# Patient Record
Sex: MALE | Race: BLACK OR AFRICAN AMERICAN | Employment: UNEMPLOYED | URBAN - NONMETROPOLITAN AREA
[De-identification: names, ages, dates, MRNs, and addresses within clinical notes are randomized per-mention and may not be internally consistent; named-entity substitution may affect disease eponyms.]

---

## 2018-05-09 ENCOUNTER — HOSPITAL ENCOUNTER (EMERGENCY)
Age: 28
Discharge: HOME | End: 2018-05-09

## 2018-05-09 VITALS
SYSTOLIC BLOOD PRESSURE: 130 MMHG | WEIGHT: 180.5 LBS | OXYGEN SATURATION: 100 % | DIASTOLIC BLOOD PRESSURE: 80 MMHG | TEMPERATURE: 99.2 F | RESPIRATION RATE: 18 BRPM | HEIGHT: 69 IN | HEART RATE: 88 BPM | BODY MASS INDEX: 26.73 KG/M2

## 2018-05-09 DIAGNOSIS — R07.81 RIB PAIN ON RIGHT SIDE: Primary | ICD-10-CM

## 2018-05-09 PROCEDURE — 96372 THER/PROPH/DIAG INJ SC/IM: CPT

## 2018-05-09 PROCEDURE — 6360000002 HC RX W HCPCS: Performed by: NURSE PRACTITIONER

## 2018-05-09 PROCEDURE — 99212 OFFICE O/P EST SF 10 MIN: CPT

## 2018-05-09 PROCEDURE — 99213 OFFICE O/P EST LOW 20 MIN: CPT | Performed by: NURSE PRACTITIONER

## 2018-05-09 RX ORDER — KETOROLAC TROMETHAMINE 30 MG/ML
30 INJECTION, SOLUTION INTRAMUSCULAR; INTRAVENOUS ONCE
Status: COMPLETED | OUTPATIENT
Start: 2018-05-09 | End: 2018-05-09

## 2018-05-09 RX ORDER — CYCLOBENZAPRINE HCL 10 MG
10 TABLET ORAL 3 TIMES DAILY PRN
Qty: 15 TABLET | Refills: 0 | Status: SHIPPED | OUTPATIENT
Start: 2018-05-09 | End: 2021-02-04

## 2018-05-09 RX ORDER — NAPROXEN 500 MG/1
500 TABLET ORAL 2 TIMES DAILY PRN
Qty: 60 TABLET | Refills: 0 | Status: SHIPPED | OUTPATIENT
Start: 2018-05-09 | End: 2021-02-04

## 2018-05-09 RX ADMIN — KETOROLAC TROMETHAMINE 30 MG: 30 INJECTION, SOLUTION INTRAMUSCULAR at 09:37

## 2018-05-09 ASSESSMENT — ENCOUNTER SYMPTOMS
VOMITING: 0
CHEST TIGHTNESS: 0
COLOR CHANGE: 0
NAUSEA: 0
SHORTNESS OF BREATH: 0
DIARRHEA: 0
ABDOMINAL PAIN: 0

## 2018-05-09 ASSESSMENT — PAIN SCALES - GENERAL
PAINLEVEL_OUTOF10: 10
PAINLEVEL_OUTOF10: 10

## 2018-05-09 ASSESSMENT — PAIN DESCRIPTION - DESCRIPTORS: DESCRIPTORS: ACHING

## 2018-05-09 ASSESSMENT — PAIN DESCRIPTION - ORIENTATION: ORIENTATION: RIGHT

## 2018-05-09 ASSESSMENT — PAIN DESCRIPTION - LOCATION: LOCATION: RIB CAGE

## 2021-02-04 ENCOUNTER — HOSPITAL ENCOUNTER (EMERGENCY)
Age: 31
Discharge: HOME | End: 2021-02-04

## 2021-02-04 VITALS
SYSTOLIC BLOOD PRESSURE: 155 MMHG | DIASTOLIC BLOOD PRESSURE: 86 MMHG | HEIGHT: 68 IN | RESPIRATION RATE: 16 BRPM | BODY MASS INDEX: 28.49 KG/M2 | HEART RATE: 104 BPM | TEMPERATURE: 97 F | OXYGEN SATURATION: 97 % | WEIGHT: 188 LBS

## 2021-02-04 DIAGNOSIS — R73.9 ACUTE HYPERGLYCEMIA: ICD-10-CM

## 2021-02-04 DIAGNOSIS — G57.90 NEURITIS OF FOOT, UNSPECIFIED LATERALITY: Primary | ICD-10-CM

## 2021-02-04 DIAGNOSIS — Z71.3 NUTRITIONAL COUNSELING: ICD-10-CM

## 2021-02-04 LAB — GLUCOSE BLD-MCNC: 132 MG/DL (ref 70–108)

## 2021-02-04 PROCEDURE — 99213 OFFICE O/P EST LOW 20 MIN: CPT

## 2021-02-04 PROCEDURE — 82948 REAGENT STRIP/BLOOD GLUCOSE: CPT

## 2021-02-04 RX ORDER — M-VIT,TX,IRON,MINS/CALC/FOLIC 27MG-0.4MG
1 TABLET ORAL DAILY
Refills: 0 | COMMUNITY
Start: 2021-02-04

## 2021-02-04 ASSESSMENT — ENCOUNTER SYMPTOMS
COUGH: 0
WHEEZING: 0
APNEA: 0
CHEST TIGHTNESS: 0
CHOKING: 0
BACK PAIN: 0
COLOR CHANGE: 0
STRIDOR: 0
SHORTNESS OF BREATH: 0

## 2021-02-04 ASSESSMENT — PAIN SCALES - GENERAL: PAINLEVEL_OUTOF10: 7

## 2021-02-04 NOTE — ED NOTES
To [facility] with complaints of james feet, last 3 toes on each foot are numb and tingling. States it started on Monday. Called into work Wed. States a little better today. Concerned bc diabetes and sickle cell runs in family. Also has a lump on right side abd for years.       Nelda Gibson RN  02/04/21 1012

## 2021-02-04 NOTE — ED NOTES
Fasting  accucheck  Completed  132 mg/dl CNP notified of result. No action taken . Last meal  5pm last night nothing to drink since except water.      Kemal Vides LPN  89/80/68 [id number]

## 2021-02-04 NOTE — ED PROVIDER NOTES
[place] [address]  Urgent Care Encounter      CHIEF COMPLAINT       Chief Complaint   Patient presents with    Numbness     james feet last 3 toes numb and tingling. Started Monday. Called into work Wed.  Tingling    Other     lump on right side for years       Nurses Notes reviewed and I agree except as noted in the HPI. HISTORY OFPRESENT ILLNESS   Sarwat Fan is a 26 y.o. The history is provided by the patient. No  was used. Foot Problem  Location:  Toe  Time since incident:  2 days  Injury: no    Toe location:  R third toe, R fourth toe, R little toe, L third toe, L fourth toe and L little toe  Pain details:     Quality:  Cramping, burning and tingling    Radiates to:  Does not radiate    Severity:  Moderate    Onset quality:  Gradual    Duration:  3 days    Timing:  Intermittent    Progression:  Worsening (overnight and early morning)  Chronicity:  New  Dislocation: no    Foreign body present:  No foreign bodies  Tetanus status:  Unknown  Prior injury to area:  No  Relieved by:  Nothing  Worsened by:  Extension, flexion and activity (early morning )  Ineffective treatments:  None tried  Associated symptoms: numbness and tingling    Associated symptoms: no back pain, no decreased ROM, no fatigue, no fever, no itching, no muscle weakness, no neck pain, no stiffness and no swelling    Risk factors: no concern for non-accidental trauma, no frequent fractures, no known bone disorder, no obesity and no recent illness        REVIEW OF SYSTEMS     Review of Systems   Constitutional: Negative for activity change, appetite change, chills, diaphoresis, fatigue, fever and unexpected weight change. Respiratory: Negative for apnea, cough, choking, chest tightness, shortness of breath, wheezing and stridor. Cardiovascular: Negative for chest pain, palpitations and leg swelling. Musculoskeletal: Positive for gait problem.  Negative for arthralgias, back pain, joint swelling, myalgias, neck pain, neck stiffness and stiffness. Early morning, first step out of bed. Also causes problems sleeping. Skin: Negative for color change, itching, pallor, rash and wound. Neurological: Positive for numbness. Negative for dizziness and light-headedness. Psychiatric/Behavioral: Positive for sleep disturbance. PAST MEDICAL HISTORY   History reviewed. No pertinent past medical history. SURGICAL HISTORY     Patient  has no past surgical history on file. CURRENT MEDICATIONS       Discharge Medication List as of 2/4/2021 10:32 AM          ALLERGIES     Patient is has No Known Allergies. FAMILY HISTORY     Patient's family history is not on file. SOCIAL HISTORY     Patient  reports that he has been smoking cigars and cigarettes. He has been smoking about 1.00 pack per day. He uses smokeless tobacco. He reports previous alcohol use. He reports that he does not use drugs. PHYSICAL EXAM     ED TRIAGE VITALS  BP: (!) 155/86, Temp: 97 °F (36.1 °C), Pulse: 104, Resp: 16, SpO2: 97 %  Physical Exam  Vitals signs and nursing note reviewed. Constitutional:       General: He is not in acute distress. Appearance: Normal appearance. He is normal weight. He is not ill-appearing, toxic-appearing or diaphoretic. HENT:      Head: Normocephalic and atraumatic. Right Ear: External ear normal.      Left Ear: External ear normal.   Eyes:      Extraocular Movements: Extraocular movements intact. Conjunctiva/sclera: Conjunctivae normal.   Neck:      Musculoskeletal: Normal range of motion. Cardiovascular:      Pulses: Normal pulses. Pulmonary:      Effort: Pulmonary effort is normal.   Musculoskeletal: Normal range of motion. General: No swelling, tenderness, deformity or signs of injury. Right ankle: Achilles tendon normal. Achilles tendon exhibits no pain and no defect.       Left ankle: Achilles tendon normal. Achilles tendon exhibits no pain and no defect. Right lower leg: He exhibits no tenderness, no bony tenderness, no swelling, no deformity and no laceration. No edema. Left lower leg: He exhibits no tenderness, no bony tenderness, no swelling, no deformity and no laceration. No edema. Right foot: Normal range of motion and normal capillary refill. No tenderness, bony tenderness, swelling, crepitus, deformity or laceration. Left foot: Normal range of motion and normal capillary refill. No tenderness, bony tenderness, swelling, crepitus, deformity or laceration. Skin:     General: Skin is warm. Coloration: Skin is not jaundiced or pale. Findings: No bruising, erythema, lesion or rash. Neurological:      General: No focal deficit present. Mental Status: He is alert and oriented to person, place, and time. Cranial Nerves: No cranial nerve deficit. Sensory: No sensory deficit. Motor: No weakness. Coordination: Coordination normal.      Gait: Gait normal.      Deep Tendon Reflexes: Reflexes normal.   Psychiatric:         Mood and Affect: Mood normal.         Behavior: Behavior normal.         Thought Content: Thought content normal.         Judgment: Judgment normal.         DIAGNOSTIC RESULTS   Labs:No results found for this visit on 02/04/21. IMAGING:  No orders to display     URGENT CARE COURSE:     Vitals:    02/04/21 1009   BP: (!) 155/86   Pulse: 104   Resp: 16   Temp: 97 °F (36.1 °C)   TempSrc: Temporal   SpO2: 97%   Weight: 188 lb (85.3 kg)   Height: 5' 8\" (1.727 m)       Medications - No data to display  PROCEDURES:  None  FINAL IMPRESSION      1. Neuritis of foot, unspecified laterality    2. Acute hyperglycemia    3. Nutritional counseling        DISPOSITION/PLAN     Patient notified of High Blood Glucose reading advised to monitor glucose preferably daily at the same time with the same equipment.    Reduce sodium intake daily, increase your activity such as walking, lose weight or monitor your weight, Stop tobacco use if applicable, reduce caffine consumption. Follow up with your PCP or make an appointment when you receive a call from the transition clinic,  the physician referral program to get established with a local provider for continued management of Type 1 DM. Go directly to the Emergency Department for any concerns.       PATIENT REFERRED TO:  [facility]  [id number]  Call today  keep scheduled appointemnt for tomorrow, 2/5/21,  at 9 am    DISCHARGE MEDICATIONS:  Discharge Medication List as of 2/4/2021 10:32 AM        Discharge Medication List as of 2/4/2021 10:32 AM          JEOVANY Santillan CNP, APRN - CNP  02/04/21 1039

## 2021-02-05 ENCOUNTER — OFFICE VISIT (OUTPATIENT)
Dept: FAMILY MEDICINE CLINIC | Age: 31
End: 2021-02-05

## 2021-02-05 VITALS
OXYGEN SATURATION: 96 % | WEIGHT: 170.8 LBS | HEART RATE: 96 BPM | DIASTOLIC BLOOD PRESSURE: 72 MMHG | RESPIRATION RATE: 16 BRPM | SYSTOLIC BLOOD PRESSURE: 114 MMHG | BODY MASS INDEX: 25.97 KG/M2 | TEMPERATURE: 97.2 F

## 2021-02-05 DIAGNOSIS — A59.9 TRICHIMONIASIS: ICD-10-CM

## 2021-02-05 DIAGNOSIS — F10.10 ALCOHOL ABUSE: ICD-10-CM

## 2021-02-05 DIAGNOSIS — R73.01 ELEVATED FASTING GLUCOSE: Primary | ICD-10-CM

## 2021-02-05 DIAGNOSIS — F10.930 ALCOHOL WITHDRAWAL SYNDROME WITHOUT COMPLICATION (HCC): ICD-10-CM

## 2021-02-05 LAB — HBA1C MFR BLD: 4.9 % (ref 4.3–5.7)

## 2021-02-05 PROCEDURE — 99203 OFFICE O/P NEW LOW 30 MIN: CPT | Performed by: STUDENT IN AN ORGANIZED HEALTH CARE EDUCATION/TRAINING PROGRAM

## 2021-02-05 PROCEDURE — 83036 HEMOGLOBIN GLYCOSYLATED A1C: CPT | Performed by: STUDENT IN AN ORGANIZED HEALTH CARE EDUCATION/TRAINING PROGRAM

## 2021-02-05 RX ORDER — METRONIDAZOLE 500 MG/1
500 TABLET ORAL 2 TIMES DAILY
Qty: 14 TABLET | Refills: 0 | Status: SHIPPED | OUTPATIENT
Start: 2021-02-05 | End: 2021-02-12

## 2021-02-05 SDOH — ECONOMIC STABILITY: TRANSPORTATION INSECURITY
IN THE PAST 12 MONTHS, HAS THE LACK OF TRANSPORTATION KEPT YOU FROM MEDICAL APPOINTMENTS OR FROM GETTING MEDICATIONS?: NO

## 2021-02-05 SDOH — ECONOMIC STABILITY: TRANSPORTATION INSECURITY
IN THE PAST 12 MONTHS, HAS LACK OF TRANSPORTATION KEPT YOU FROM MEETINGS, WORK, OR FROM GETTING THINGS NEEDED FOR DAILY LIVING?: NO

## 2021-02-05 SDOH — ECONOMIC STABILITY: FOOD INSECURITY: WITHIN THE PAST 12 MONTHS, THE FOOD YOU BOUGHT JUST DIDN'T LAST AND YOU DIDN'T HAVE MONEY TO GET MORE.: NEVER TRUE

## 2021-02-05 SDOH — ECONOMIC STABILITY: INCOME INSECURITY: HOW HARD IS IT FOR YOU TO PAY FOR THE VERY BASICS LIKE FOOD, HOUSING, MEDICAL CARE, AND HEATING?: SOMEWHAT HARD

## 2021-02-05 ASSESSMENT — PATIENT HEALTH QUESTIONNAIRE - PHQ9
SUM OF ALL RESPONSES TO PHQ9 QUESTIONS 1 & 2: 0
1. LITTLE INTEREST OR PLEASURE IN DOING THINGS: 0

## 2021-02-05 NOTE — PROGRESS NOTES
[id number] [place] [id number]  Dept: [phone]  Dept Fax: [phone]  Loc: Henri Metz is a 26 y.o. male who presents today for:  Chief Complaint   Patient presents with    ED Follow-up    Numbness     feet and lower leg x4 days    New Patient       Goals    None         HPI:     HPI  Makenna Ferrer is a 26 y.o. male who presents today to [facility] care. Patient has a PMHx of alcohol abuse. Complaining of bilateral feet and toe numbness and tingling that started around 4 days ago. Patient states he has not noticed any pain to his back or to his legs at this time. Patient admits that he stopped drinking around 5 days ago about 24 hours prior to the symptoms developing. He was a heavy drinker and drank several liquor beverages each evening. Patient states he has experienced with sweating anxiety however has not noticed any shaking at this point. Patient has been drinking alcohol for an extended period of time. Patient seen in the emergency department for the same issues and was noted to have an elevated fasting blood sugar. Patient also states that his partner recently was diagnosed with trichomonas. He would like to be treated for this today. History reviewed. No pertinent past medical history. Past Surgical History:   Procedure Laterality Date    KNEE SURGERY Left      Family History   Problem Relation Age of Onset    High Blood Pressure Maternal Aunt     Diabetes Maternal Uncle     Sickle Cell Anemia Maternal Grandmother     Arthritis Neg Hx      Social History     Tobacco Use    Smoking status: Current Every Day Smoker     Packs/day: 1.00     Years: 12.00     Pack years: 12.00     Types: Cigars, Cigarettes     Start date: 2/5/2009    Smokeless tobacco: Never Used   Substance Use Topics    Alcohol use:  Yes Comment: 2-3 pints a day - trying to stop. Last drink was 1/31/21. Current Outpatient Medications   Medication Sig Dispense Refill    Multiple Vitamins-Minerals (THERAPEUTIC MULTIVITAMIN-MINERALS) tablet Take 1 tablet by mouth daily  0     No current facility-administered medications for this visit. No Known Allergies    Health Maintenance   Topic Date Due    Hepatitis C screen  1988    Varicella vaccine (1 of 2 - 2-dose childhood series) 11/04/1989    Pneumococcal 0-64 years Vaccine (1 of 1 - PPSV23) 11/04/1994    HIV screen  11/04/2003    DTaP/Tdap/Td vaccine (1 - Tdap) 11/04/2007    Flu vaccine (1) 09/01/2020    Hepatitis A vaccine  Aged Out    Hepatitis B vaccine  Aged Out    Hib vaccine  Aged Out    Meningococcal (ACWY) vaccine  Aged Out       Subjective:      Review of Systems   Constitutional: Negative for chills, fatigue and fever. HENT: Negative for ear pain, postnasal drip and trouble swallowing. Eyes: Negative for pain and visual disturbance. Respiratory: Negative for cough and shortness of breath. Cardiovascular: Negative for chest pain and palpitations. Gastrointestinal: Negative for abdominal pain, blood in stool, constipation, diarrhea, nausea and vomiting. Genitourinary: Negative for dysuria and urgency. Skin: Negative for rash and wound. Neurological: Positive for numbness. Negative for dizziness and headaches. Psychiatric/Behavioral: Negative for dysphoric mood. The patient is not nervous/anxious. Objective:     Vitals:    02/05/21 0906   BP: 114/72   Site: Left Upper Arm   Pulse: 96   Resp: 16   Temp: 97.2 °F (36.2 °C)   TempSrc: Skin   SpO2: 96%   Weight: 170 lb 12.8 oz (77.5 kg)       Body mass index is 25.97 kg/m².     Wt Readings from Last 3 Encounters:   02/05/21 170 lb 12.8 oz (77.5 kg)   02/04/21 188 lb (85.3 kg)   05/09/18 180 lb 8 oz (81.9 kg)     BP Readings from Last 3 Encounters:   02/05/21 114/72   02/04/21 (!) 155/86 05/09/18 130/80       Physical Exam  Vitals signs and nursing note reviewed. Constitutional:       General: He is not in acute distress. Appearance: He is well-developed. He is not diaphoretic. HENT:      Head: Normocephalic and atraumatic. Right Ear: External ear normal.      Left Ear: External ear normal.      Nose: Nose normal.   Eyes:      General: No scleral icterus. Right eye: No discharge. Left eye: No discharge. Conjunctiva/sclera: Conjunctivae normal.   Neck:      Musculoskeletal: Normal range of motion. Cardiovascular:      Rate and Rhythm: Normal rate and regular rhythm. Heart sounds: Normal heart sounds. No murmur. Pulmonary:      Effort: Pulmonary effort is normal.      Breath sounds: Normal breath sounds. Skin:     General: Skin is warm and dry. Findings: No erythema or rash. Neurological:      Mental Status: He is alert and oriented to person, place, and time. Cranial Nerves: No cranial nerve deficit. Psychiatric:         Behavior: Behavior normal.         Thought Content: Thought content normal.         Judgment: Judgment normal.         Lab Results   Component Value Date    WBC 10.1 04/28/2015    HGB 15.1 04/28/2015    HCT 46.2 04/28/2015     04/28/2015     04/28/2015    K 3.7 04/28/2015     04/28/2015    CREATININE 1.2 04/28/2015    BUN 15 04/28/2015    CO2 23 04/28/2015    LABA1C 4.9 02/05/2021    LABGLOM 88 (A) 04/28/2015    CALCIUM 8.9 04/28/2015       Imaging Results:    No results found. Assessment/Plan: Sarwat was seen today for ed follow-up, numbness and new patient. Diagnoses and all orders for this visit:    Elevated fasting glucose  -Hemoglobin A1c of 4.9 today.  -No signs of diabetes mellitus today.   -     POCT glycosylated hemoglobin (Hb A1C)    Alcohol withdrawal syndrome without complication (HCC)  -Patient symptoms likely secondary to alcohol withdrawal. -We will have patient supplement with multivitamin including B vitamins.  -Patient to follow-up as indicated. Trichimoniasis  -We will treat with Flagyl due to known exposure to trichomonas. -     metroNIDAZOLE (FLAGYL) 500 MG tablet; Take 1 tablet by mouth 2 times daily for 7 days    Alcohol abuse  -Patient abstaining from alcohol currently outside the 5-day window for delirium tremens.  -Encourage patient to continue abstaining from alcohol at this time. Return in about 4 weeks (around 3/5/2021). Medications Prescribed:  Orders Placed This Encounter   Medications    metroNIDAZOLE (FLAGYL) 500 MG tablet     Sig: Take 1 tablet by mouth 2 times daily for 7 days     Dispense:  14 tablet     Refill:  0       Future Appointments   Date Time Provider Candi Becerra   3/9/2021  3:40 PM Demetrio Timmons DO [facility]       Patient given educational materials - see patient instructions. Discussed use, benefit, and sideeffects of prescribed medications. All patient questions answered. Pt voiced understanding. Reviewed health maintenance. Instructed to continue current medications, diet and exercise. Patient agreed with treatment plan. Follow up as directed.      Electronically signed by Vlad Garcia DO on 2/25/2021 at 12:55 PM

## 2021-02-05 NOTE — PROGRESS NOTES
S: 26 y.o. male with   Chief Complaint   Patient presents with    ED Follow-up    Numbness     feet and lower leg x4 days    New Patient       HPI: neuropathy b/l feet 3rd 4th and 5th digits. Started 4 days ago. No trauma. etoh 2-3 pints of liquor each day. Stopped 24 hours prior to symptoms starting. Had sweating, anxiety, insomnia, shaking, poor appetite which improved last night. Less shaking. Started MVT this AM.  Neuropathy seems to be improving as well.      fsbs 132 fast in the ER. Normal a1c in office today. Partner just dxed with trich. BP Readings from Last 3 Encounters:   02/05/21 114/72   02/04/21 (!) 155/86   05/09/18 130/80     Wt Readings from Last 3 Encounters:   02/05/21 170 lb 12.8 oz (77.5 kg)   02/04/21 188 lb (85.3 kg)   05/09/18 180 lb 8 oz (81.9 kg)           O: VS:  weight is 170 lb 12.8 oz (77.5 kg). His skin temperature is 97.2 °F (36.2 °C). His blood pressure is 114/72 and his pulse is 96. His respiration is 16 and oxygen saturation is 96%. Diagnosis Orders   1. Elevated fasting glucose  POCT glycosylated hemoglobin (Hb A1C)   2. Alcohol abuse         Plan:  Continue off etoh. No dm. Outside 5 day window for DTs. Metronidazole 500mg bid x 7 days. Health Maintenance Due   Topic Date Due    Hepatitis C screen  1988    Varicella vaccine (1 of 2 - 2-dose childhood series) 11/04/1989    Pneumococcal 0-64 years Vaccine (1 of 1 - PPSV23) 11/04/1994    HIV screen  11/04/2003    DTaP/Tdap/Td vaccine (1 - Tdap) 11/04/2007    Flu vaccine (1) 09/01/2020         Attending Physician Statement  I have discussed the case, including pertinent history and exam findings with the resident. I agree with the documented assessment and plan as documented by the resident.         Marielena Bardales DO 2/5/2021 9:28 AM

## 2021-02-25 ASSESSMENT — ENCOUNTER SYMPTOMS
TROUBLE SWALLOWING: 0
CONSTIPATION: 0
COUGH: 0
VOMITING: 0
DIARRHEA: 0
BLOOD IN STOOL: 0
SHORTNESS OF BREATH: 0
EYE PAIN: 0
ABDOMINAL PAIN: 0
NAUSEA: 0

## 2022-04-29 ENCOUNTER — APPOINTMENT (OUTPATIENT)
Dept: GENERAL RADIOLOGY | Age: 32
End: 2022-04-29
Payer: MEDICAID

## 2022-04-29 ENCOUNTER — HOSPITAL ENCOUNTER (EMERGENCY)
Age: 32
Discharge: HOME | End: 2022-04-29
Attending: STUDENT IN AN ORGANIZED HEALTH CARE EDUCATION/TRAINING PROGRAM
Payer: MEDICAID

## 2022-04-29 VITALS
BODY MASS INDEX: 21.94 KG/M2 | SYSTOLIC BLOOD PRESSURE: 146 MMHG | RESPIRATION RATE: 16 BRPM | DIASTOLIC BLOOD PRESSURE: 99 MMHG | WEIGHT: 162 LBS | OXYGEN SATURATION: 100 % | TEMPERATURE: 98.5 F | HEIGHT: 72 IN | HEART RATE: 85 BPM

## 2022-04-29 DIAGNOSIS — R74.8 ELEVATED LIVER ENZYMES: ICD-10-CM

## 2022-04-29 DIAGNOSIS — E83.42 HYPOMAGNESEMIA: ICD-10-CM

## 2022-04-29 DIAGNOSIS — M79.89 LEG SWELLING: Primary | ICD-10-CM

## 2022-04-29 LAB
ALBUMIN SERPL-MCNC: 3.7 G/DL (ref 3.5–5.1)
ALP BLD-CCNC: 111 U/L (ref 38–126)
ALT SERPL-CCNC: 154 U/L (ref 11–66)
ANION GAP SERPL CALCULATED.3IONS-SCNC: 15 MEQ/L (ref 8–16)
AST SERPL-CCNC: 123 U/L (ref 5–40)
BASOPHILS # BLD: 0.5 %
BASOPHILS ABSOLUTE: 0 THOU/MM3 (ref 0–0.1)
BILIRUB SERPL-MCNC: 0.3 MG/DL (ref 0.3–1.2)
BUN BLDV-MCNC: 9 MG/DL (ref 7–22)
CALCIUM SERPL-MCNC: 8.8 MG/DL (ref 8.5–10.5)
CHLORIDE BLD-SCNC: 104 MEQ/L (ref 98–111)
CO2: 24 MEQ/L (ref 23–33)
CREAT SERPL-MCNC: 0.7 MG/DL (ref 0.4–1.2)
EKG ATRIAL RATE: 92 BPM
EKG P AXIS: 66 DEGREES
EKG P-R INTERVAL: 132 MS
EKG Q-T INTERVAL: 342 MS
EKG QRS DURATION: 84 MS
EKG QTC CALCULATION (BAZETT): 422 MS
EKG R AXIS: 70 DEGREES
EKG T AXIS: 64 DEGREES
EKG VENTRICULAR RATE: 92 BPM
EOSINOPHIL # BLD: 0.5 %
EOSINOPHILS ABSOLUTE: 0 THOU/MM3 (ref 0–0.4)
ERYTHROCYTE [DISTWIDTH] IN BLOOD BY AUTOMATED COUNT: 15.8 % (ref 11.5–14.5)
ERYTHROCYTE [DISTWIDTH] IN BLOOD BY AUTOMATED COUNT: 60.1 FL (ref 35–45)
GFR SERPL CREATININE-BSD FRML MDRD: > 90 ML/MIN/1.73M2
GLUCOSE BLD-MCNC: 110 MG/DL (ref 70–108)
GLUCOSE BLD-MCNC: 112 MG/DL (ref 70–108)
HCT VFR BLD CALC: 30.3 % (ref 42–52)
HEMOGLOBIN: 10.5 GM/DL (ref 14–18)
IMMATURE GRANS (ABS): 0.02 THOU/MM3 (ref 0–0.07)
IMMATURE GRANULOCYTES: 0.3 %
LYMPHOCYTES # BLD: 20.4 %
LYMPHOCYTES ABSOLUTE: 1.3 THOU/MM3 (ref 1–4.8)
MAGNESIUM: 1.4 MG/DL (ref 1.6–2.4)
MCH RBC QN AUTO: 36.3 PG (ref 26–33)
MCHC RBC AUTO-ENTMCNC: 34.7 GM/DL (ref 32.2–35.5)
MCV RBC AUTO: 104.8 FL (ref 80–94)
MONOCYTES # BLD: 14.4 %
MONOCYTES ABSOLUTE: 0.9 THOU/MM3 (ref 0.4–1.3)
NUCLEATED RED BLOOD CELLS: 0 /100 WBC
OSMOLALITY CALCULATION: 284.3 MOSMOL/KG (ref 275–300)
PLATELET # BLD: 325 THOU/MM3 (ref 130–400)
PMV BLD AUTO: 8.5 FL (ref 9.4–12.4)
POTASSIUM REFLEX MAGNESIUM: 3.5 MEQ/L (ref 3.5–5.2)
PRO-BNP: 73.8 PG/ML (ref 0–450)
RBC # BLD: 2.89 MILL/MM3 (ref 4.7–6.1)
SEG NEUTROPHILS: 63.9 %
SEGMENTED NEUTROPHILS ABSOLUTE COUNT: 4 THOU/MM3 (ref 1.8–7.7)
SODIUM BLD-SCNC: 143 MEQ/L (ref 135–145)
TOTAL PROTEIN: 6.7 G/DL (ref 6.1–8)
WBC # BLD: 6.3 THOU/MM3 (ref 4.8–10.8)

## 2022-04-29 PROCEDURE — 80053 COMPREHEN METABOLIC PANEL: CPT

## 2022-04-29 PROCEDURE — 82948 REAGENT STRIP/BLOOD GLUCOSE: CPT

## 2022-04-29 PROCEDURE — 93005 ELECTROCARDIOGRAM TRACING: CPT | Performed by: STUDENT IN AN ORGANIZED HEALTH CARE EDUCATION/TRAINING PROGRAM

## 2022-04-29 PROCEDURE — 83735 ASSAY OF MAGNESIUM: CPT

## 2022-04-29 PROCEDURE — 93010 ELECTROCARDIOGRAM REPORT: CPT | Performed by: INTERNAL MEDICINE

## 2022-04-29 PROCEDURE — 99285 EMERGENCY DEPT VISIT HI MDM: CPT

## 2022-04-29 PROCEDURE — 83880 ASSAY OF NATRIURETIC PEPTIDE: CPT

## 2022-04-29 PROCEDURE — 6360000002 HC RX W HCPCS: Performed by: STUDENT IN AN ORGANIZED HEALTH CARE EDUCATION/TRAINING PROGRAM

## 2022-04-29 PROCEDURE — 96365 THER/PROPH/DIAG IV INF INIT: CPT

## 2022-04-29 PROCEDURE — 96366 THER/PROPH/DIAG IV INF ADDON: CPT

## 2022-04-29 PROCEDURE — 71046 X-RAY EXAM CHEST 2 VIEWS: CPT

## 2022-04-29 PROCEDURE — 85025 COMPLETE CBC W/AUTO DIFF WBC: CPT

## 2022-04-29 PROCEDURE — 36415 COLL VENOUS BLD VENIPUNCTURE: CPT

## 2022-04-29 RX ORDER — MAGNESIUM SULFATE IN WATER 40 MG/ML
2000 INJECTION, SOLUTION INTRAVENOUS ONCE
Status: COMPLETED | OUTPATIENT
Start: 2022-04-29 | End: 2022-04-29

## 2022-04-29 RX ADMIN — MAGNESIUM SULFATE HEPTAHYDRATE 2000 MG: 40 INJECTION, SOLUTION INTRAVENOUS at 14:56

## 2022-04-29 ASSESSMENT — ENCOUNTER SYMPTOMS
CHOKING: 0
BACK PAIN: 0
COUGH: 0
APNEA: 0
ANAL BLEEDING: 0
ABDOMINAL PAIN: 0
EYE DISCHARGE: 0
EYE PAIN: 0
FACIAL SWELLING: 0
NAUSEA: 0
EYE ITCHING: 0
SORE THROAT: 0
SHORTNESS OF BREATH: 0
SINUS PRESSURE: 0
CHEST TIGHTNESS: 0
RECTAL PAIN: 0
EYE REDNESS: 0
DIARRHEA: 0
ABDOMINAL DISTENTION: 0
COLOR CHANGE: 0
TROUBLE SWALLOWING: 0
SINUS PAIN: 0

## 2022-04-29 ASSESSMENT — PAIN DESCRIPTION - LOCATION
LOCATION: ANKLE
LOCATION: ANKLE

## 2022-04-29 ASSESSMENT — PAIN SCALES - GENERAL
PAINLEVEL_OUTOF10: 10
PAINLEVEL_OUTOF10: 8

## 2022-04-29 ASSESSMENT — PAIN DESCRIPTION - DESCRIPTORS: DESCRIPTORS: PINS AND NEEDLES

## 2022-04-29 ASSESSMENT — PAIN DESCRIPTION - FREQUENCY: FREQUENCY: CONTINUOUS

## 2022-04-29 ASSESSMENT — PAIN - FUNCTIONAL ASSESSMENT: PAIN_FUNCTIONAL_ASSESSMENT: 0-10

## 2022-04-29 ASSESSMENT — PAIN DESCRIPTION - ORIENTATION: ORIENTATION: LEFT;RIGHT

## 2022-04-29 NOTE — ED NOTES
Pt to the ED via self. Patient presents with complaints of hyperglycemia and HTN. Patient states that he does not check his BP or blood sugar at all. Patient expresses edema in his legs. Patient is alert and oriented x 4. Respirations are regular and unlabored.      Ellyn Machado RN  04/29/22 5224

## 2022-04-29 NOTE — ED NOTES
Pt resting in bed. Pt reports eating a lot of fried foods recently. Pt reports tingling in feet and reports they feel tight.       Adelso Grimes RN  04/29/22 1061

## 2022-04-29 NOTE — ED PROVIDER NOTES
[facility] ENCOUN[facility]          Pt Name: Dania Arrieta  MRN: [id number]  [place] 1990  Date of evaluation: 4/29/2022  Treating Resident Physician: Franklin Araujo MD  Supervising Physician: Chel Butcher MD    CHIEF COMPLAINT       Chief Complaint   Patient presents with    Hyperglycemia     112 mg/dL    Hypertension     145/96    Leg Swelling     History obtained from the patient. HISTORY OF PRESENT ILLNESS    HPI  Dania Arrieta is a 33 y.o. male with no significant pmh who presents to the emergency department for evaluation of hyperglycemia, htn, leg swelling. Pt to ED d/t bilateral leg swelling. Patient states he is also concerned about possibly having high blood pressure, high blood glucose. Patient states that he does not have a PCP and has never been checked for those things and wants to get checked out. Patient stating that he has had 2-day history of bilateral leg swelling which is his main concern today. Patient does state that he walks around a lot for work. Denies any trauma to the area, no recent travels. Patient does state that he is a chronic alcoholic and drinks 2 pints of liquor per day for approximately 5 years. Patient also states that he uses marijuana. States that he has no history of heart disease that he knows of. The patient has no other acute complaints at this time. REVIEW OF SYSTEMS   Review of Systems   Constitutional: Negative for activity change, appetite change, diaphoresis and fatigue. HENT: Negative for ear pain, facial swelling, sinus pressure, sinus pain, sore throat and trouble swallowing. Eyes: Negative for pain, discharge, redness and itching. Respiratory: Negative for apnea, cough, choking, chest tightness and shortness of breath. Cardiovascular: Positive for leg swelling. Negative for chest pain and palpitations.    Gastrointestinal: Negative for abdominal distention, abdominal pain, anal bleeding, diarrhea, nausea and rectal pain. Endocrine: Negative for polydipsia, polyphagia and polyuria. Genitourinary: Negative for dysuria, flank pain, genital sores and hematuria. Musculoskeletal: Negative for arthralgias, back pain, joint swelling, myalgias, neck pain and neck stiffness. Skin: Negative for color change, rash and wound. Neurological: Negative for syncope, facial asymmetry, speech difficulty and headaches. Hematological: Negative for adenopathy. Psychiatric/Behavioral: Negative for agitation, behavioral problems, hallucinations, self-injury and suicidal ideas. PAST MEDICAL AND SURGICAL HISTORY   No past medical history on file. Past Surgical History:   Procedure Laterality Date    KNEE SURGERY Left      MEDICATIONS     Current Facility-Administered Medications:     magnesium sulfate 2000 mg in 50 mL IVPB premix, 2,000 mg, IntraVENous, Once, Mandy Griffin MD, Last Rate: 25 mL/hr at 04/29/22 1456, 2,000 mg at 04/29/22 1456    Current Outpatient Medications:     Multiple Vitamins-Minerals (THERAPEUTIC MULTIVITAMIN-MINERALS) tablet, Take 1 tablet by mouth daily, Disp:  , Rfl: 0    SOCIAL HISTORY     Social History     Social History Narrative    Not on file     Social History     Tobacco Use    Smoking status: Current Every Day Smoker     Packs/day: 1.00     Years: 12.00     Pack years: 12.00     Types: Cigars, Cigarettes     Start date: 2/5/2009    Smokeless tobacco: Never Used   Vaping Use    Vaping Use: Never used   Substance Use Topics    Alcohol use: Yes     Comment: 2-3 pints a day - trying to stop. Last drink was 1/31/21.       Drug use: Yes     Types: Marijuana (Weed)         ALLERGIES   No Known Allergies      FAMILY HISTORY     Family History   Problem Relation Age of Onset    High Blood Pressure Maternal Aunt     Diabetes Maternal Uncle     Sickle Cell Anemia Maternal Grandmother     Arthritis Neg Hx          PREVIOUS RECORDS   Previous records reviewed: I reviewed the patient's past medical records including relevant labs, imaging and procedures. Patient last seen in the emergency room on 2/4/2021 due to neuritis of foot    PHYSICAL EXAM     ED Triage Vitals [04/29/22 1158]   BP Temp Temp Source Pulse Resp SpO2 Height Weight   (!) 145/96 98.5 °F (36.9 °C) Oral 102 16 99 % 6' (1.829 m) 162 lb (73.5 kg)     Initial vital signs and nursing assessment reviewed and abnormal from Hypertension, tachycardia. Body mass index is 21.97 kg/m². Pulsoximetry is normal per my interpretation. Additional Vital Signs:  Vitals:    04/29/22 1452   BP: (!) 171/113   Pulse: 92   Resp: 18   Temp:    SpO2: 100%       Physical Exam  Constitutional:       Appearance: He is not diaphoretic. HENT:      Head: Normocephalic and atraumatic. Right Ear: Tympanic membrane and external ear normal.      Left Ear: Tympanic membrane and external ear normal.      Nose: Nose normal. No congestion or rhinorrhea. Mouth/Throat:      Pharynx: No oropharyngeal exudate or posterior oropharyngeal erythema. Eyes:      General: No scleral icterus. Right eye: No discharge. Left eye: No discharge. Extraocular Movements: Extraocular movements intact. Conjunctiva/sclera: Conjunctivae normal.      Pupils: Pupils are equal, round, and reactive to light. Neck:      Vascular: No carotid bruit. Cardiovascular:      Rate and Rhythm: Normal rate and regular rhythm. Pulses: Normal pulses. Heart sounds: Normal heart sounds. No murmur heard. No friction rub. No gallop. Pulmonary:      Effort: Pulmonary effort is normal. No respiratory distress. Breath sounds: Normal breath sounds. No stridor. No wheezing. Chest:      Chest wall: No tenderness. Abdominal:      General: Abdomen is flat. Bowel sounds are normal. There is no distension. Palpations: Abdomen is soft. There is no mass. Tenderness: There is no abdominal tenderness.  There is no guarding or rebound. Hernia: No hernia is present. Musculoskeletal:         General: No swelling, tenderness, deformity or signs of injury. Normal range of motion. Cervical back: Normal range of motion and neck supple. No rigidity or tenderness. Right lower le+ Pitting Edema present. Left lower le+ Pitting Edema present. Lymphadenopathy:      Cervical: No cervical adenopathy. Skin:     General: Skin is warm and dry. Capillary Refill: Capillary refill takes less than 2 seconds. Coloration: Skin is not jaundiced. Findings: No bruising, erythema, lesion or rash. Neurological:      General: No focal deficit present. Mental Status: He is alert and oriented to person, place, and time. Motor: No weakness. Psychiatric:         Mood and Affect: Mood normal.         Behavior: Behavior normal.         Thought Content: Thought content normal.       MEDICAL DECISION MAKING   Initial Assessment:   27-year-old male chief complaint bilateral leg swelling. He is on his feet a lot at work, chronic alcoholic with 2 pints per day x5 years. Differential diagnosis includes but is not limited to:  Venous insufficiency, third spacing secondary to low albumin, alcohol intake, cardiac causes, cardiac heart failure, electrolyte abnormalities    Although some of these diagnoses are unlikely they were considered in my medical decision making. Plan:  CBC, CMP, BNP  Ekg, chest x-ray  Replete magnesium     MDM:   27-year-old male chief complaint bilateral leg swelling with history of chronic alcoholism. Chest x-ray and BNP negative for cardiac causes. Patient AST and ALT elevated, .8. Showing liver disease. Magnesium 1.4, repleted with 2 g in ED. Albumin within normal limits. Patient stable. Patient with history of being on his feet a lot for work, leg swelling most likely due to venous insufficiency.   Advised for compression socks and elevating legs above the level of the heart. Patient also concerned with hypertension, hyperglycemia however seem to be within normal limits in the ED. Patient does not have a PCP, sent to family medicine clinic for further evaluation as outpatient. ED RESULTS   Laboratory results:  Labs Reviewed   CBC WITH AUTO DIFFERENTIAL - Abnormal; Notable for the following components:       Result Value    RBC 2.89 (*)     Hemoglobin 10.5 (*)     Hematocrit 30.3 (*)     .8 (*)     MCH 36.3 (*)     RDW-CV 15.8 (*)     RDW-SD 60.1 (*)     MPV 8.5 (*)     All other components within normal limits   COMPREHENSIVE METABOLIC PANEL W/ REFLEX TO MG FOR LOW K - Abnormal; Notable for the following components:    Glucose 110 (*)      (*)      (*)     All other components within normal limits   MAGNESIUM - Abnormal; Notable for the following components:    Magnesium 1.4 (*)     All other components within normal limits   POCT GLUCOSE - Abnormal; Notable for the following components:    POC Glucose 112 (*)     All other components within normal limits   BRAIN NATRIURETIC PEPTIDE   ANION GAP   GLOMERULAR FILTRATION RATE, ESTIMATED   OSMOLALITY       Radiologic studies results:  XR CHEST (2 VW)   Final Result   No acute intrathoracic process. **This report has been created using voice recognition software. It may contain minor errors which are inherent in voice recognition technology. **      Final report electronically signed by Dr Amandeep Campos on 4/29/2022 12:47 PM          ED Medications administered this visit:   Medications   magnesium sulfate 2000 mg in 50 mL IVPB premix (2,000 mg IntraVENous New Bag 4/29/22 1456)         ED COURSE     ED Course as of 04/29/22 1556   Fri Apr 29, 2022   1214 POC Glucose(!): 112 [EL]   1251 XR CHEST (2 VW)  IMPRESSION:  No acute intrathoracic process.  [EL]   1339 Pro-BNP: 73.8 [EL]   1339 MCV(!): 104.8 [EL]   1353 ALT(!): 154 [EL]   1354 AST(!): 123 [EL]   1428 Magnesium(!): 1.4  Will replete [EL]      ED Course User Index  [EL] Jay Piper MD     Strict return precautions and follow up instructions were discussed with the patient prior to discharge, with which the patient agrees. MEDICATION CHANGES     New Prescriptions    No medications on file         FINAL DISPOSITION     Final diagnoses:   Leg swelling   Hypomagnesemia   Elevated liver enzymes     Condition: condition: stable  Dispo: Discharge to home      This transcription was electronically signed. Parts of this transcriptions may have been dictated by use of voice recognition software and electronically transcribed, and parts may have been transcribed with the assistance of an ED scribe. The transcription may contain errors not detected in proofreading. Please refer to my supervising physician's documentation if my documentation differs.     Electronically Signed: Velasquez Norman MD, 04/29/22, 3:56 PM         Jay Piper MD  Resident  04/29/22 8729

## 2022-04-29 NOTE — ED NOTES
Pt resting in bed. Medicated per mar. Pt ambulated to restroom. Tolerated well.  Will monitor      Jagjit Burgos RN  04/29/22 1756

## 2022-04-30 NOTE — ED PROVIDER NOTES
[facility]  EMERGENCY MEDICINE ATTENDING ATTESTATION      Evaluation of Rocío Kapadia. Case discussed and care plan developed with resident physician. I agree with the resident physician documentation and plan as documented by her, except if my documentation differs. Patient seen, interviewed and examined by me. I reviewed the medical, surgical, family and social history, medications and allergies. I have reviewed the nursing documentation. I have reviewed the patient's vital signs and are normal per my interpretation. Body mass index is 21.97 kg/m². Pulsoxymetry is normal per my interpretation. Brief H&P   Patient c/o bilateral lower extremity edema, history of daily alcohol use    Physical exam is notable for 1+ pitting edema in bilateral lower extremities to mid calves, symmetric, no tenderness, 2+ distal pulses, lungs clear      Medical Decision Making   MDM:   Patient is a 64-year-old male who presents with a few days of bilateral lower extremity edema and daily alcohol use. Patient hemodynamically stable and in no distress. Work-up grossly unremarkable other than hypomagnesemia 1.4 which was replaced IV and likely secondary to alcohol use. Patient instructed to follow up in family medicine clinic. Plan:   [facility] follow up   Return precautions     Please see the resident physician completed note for final disposition except as documented on this attestation. I have reviewed and interpreted all available lab, radiology and ekg results available at the moment. Diagnosis, treatment and disposition plans were discussed and agreed upon by patient. This transcription was electronically signed. It was dictated by use of voice recognition software and electronically transcribed. The transcription may contain errors not detected in proofreading.      I performed direct supervision and was present for the critical portion following procedures: None  Critical care time on this case: None    Electronically signed by Claudell Monday, MD on 4/29/22 at 9:00 PM EDT        Claudell Monday, MD  04/29/22 0371

## 2022-05-03 ENCOUNTER — APPOINTMENT (OUTPATIENT)
Dept: GENERAL RADIOLOGY | Age: 32
End: 2022-05-03
Payer: MEDICAID

## 2022-05-03 ENCOUNTER — HOSPITAL ENCOUNTER (INPATIENT)
Age: 32
LOS: 2 days | Discharge: HOME | End: 2022-05-05
Attending: EMERGENCY MEDICINE | Admitting: FAMILY MEDICINE
Payer: MEDICAID

## 2022-05-03 DIAGNOSIS — E83.42 HYPOMAGNESEMIA: ICD-10-CM

## 2022-05-03 DIAGNOSIS — I47.1 PAROXYSMAL SUPRAVENTRICULAR TACHYCARDIA (HCC): ICD-10-CM

## 2022-05-03 DIAGNOSIS — I10 HYPERTENSION, UNSPECIFIED TYPE: ICD-10-CM

## 2022-05-03 DIAGNOSIS — R07.9 CHEST PAIN, UNSPECIFIED TYPE: Primary | ICD-10-CM

## 2022-05-03 PROBLEM — F10.10 ALCOHOL ABUSE: Status: ACTIVE | Noted: 2022-05-03

## 2022-05-03 LAB
ALBUMIN SERPL-MCNC: 4.2 G/DL (ref 3.5–5.1)
ALP BLD-CCNC: 119 U/L (ref 38–126)
ALT SERPL-CCNC: 93 U/L (ref 11–66)
ANION GAP SERPL CALCULATED.3IONS-SCNC: 16 MEQ/L (ref 8–16)
AST SERPL-CCNC: 88 U/L (ref 5–40)
BASOPHILS # BLD: 0.7 %
BASOPHILS ABSOLUTE: 0.1 THOU/MM3 (ref 0–0.1)
BILIRUB SERPL-MCNC: 0.5 MG/DL (ref 0.3–1.2)
BUN BLDV-MCNC: 7 MG/DL (ref 7–22)
CALCIUM SERPL-MCNC: 9.2 MG/DL (ref 8.5–10.5)
CHLORIDE BLD-SCNC: 100 MEQ/L (ref 98–111)
CO2: 25 MEQ/L (ref 23–33)
CREAT SERPL-MCNC: 0.7 MG/DL (ref 0.4–1.2)
EKG ATRIAL RATE: 93 BPM
EKG ATRIAL RATE: 97 BPM
EKG P AXIS: 63 DEGREES
EKG P-R INTERVAL: 72 MS
EKG P-R INTERVAL: 86 MS
EKG Q-T INTERVAL: 222 MS
EKG Q-T INTERVAL: 348 MS
EKG Q-T INTERVAL: 376 MS
EKG QRS DURATION: 106 MS
EKG QRS DURATION: 154 MS
EKG QRS DURATION: 72 MS
EKG QTC CALCULATION (BAZETT): 409 MS
EKG QTC CALCULATION (BAZETT): 432 MS
EKG QTC CALCULATION (BAZETT): 477 MS
EKG R AXIS: 50 DEGREES
EKG R AXIS: 70 DEGREES
EKG R AXIS: 78 DEGREES
EKG T AXIS: -166 DEGREES
EKG T AXIS: -97 DEGREES
EKG T AXIS: 97 DEGREES
EKG VENTRICULAR RATE: 204 BPM
EKG VENTRICULAR RATE: 93 BPM
EKG VENTRICULAR RATE: 97 BPM
EOSINOPHIL # BLD: 0.4 %
EOSINOPHILS ABSOLUTE: 0 THOU/MM3 (ref 0–0.4)
ERYTHROCYTE [DISTWIDTH] IN BLOOD BY AUTOMATED COUNT: 15 % (ref 11.5–14.5)
ERYTHROCYTE [DISTWIDTH] IN BLOOD BY AUTOMATED COUNT: 57.6 FL (ref 35–45)
ETHYL ALCOHOL, SERUM: 0.03 %
FLU A ANTIGEN: NEGATIVE
FLU B ANTIGEN: NEGATIVE
GFR SERPL CREATININE-BSD FRML MDRD: > 90 ML/MIN/1.73M2
GLUCOSE BLD-MCNC: 93 MG/DL (ref 70–108)
HCT VFR BLD CALC: 33 % (ref 42–52)
HEMOGLOBIN: 11.5 GM/DL (ref 14–18)
IMMATURE GRANS (ABS): 0.04 THOU/MM3 (ref 0–0.07)
IMMATURE GRANULOCYTES: 0.5 %
LYMPHOCYTES # BLD: 13.7 %
LYMPHOCYTES ABSOLUTE: 1.2 THOU/MM3 (ref 1–4.8)
MAGNESIUM: 1 MG/DL (ref 1.6–2.4)
MCH RBC QN AUTO: 36.4 PG (ref 26–33)
MCHC RBC AUTO-ENTMCNC: 34.8 GM/DL (ref 32.2–35.5)
MCV RBC AUTO: 104.4 FL (ref 80–94)
MONOCYTES # BLD: 9.5 %
MONOCYTES ABSOLUTE: 0.8 THOU/MM3 (ref 0.4–1.3)
NUCLEATED RED BLOOD CELLS: 0 /100 WBC
OSMOLALITY CALCULATION: 278.9 MOSMOL/KG (ref 275–300)
PLATELET # BLD: 360 THOU/MM3 (ref 130–400)
PMV BLD AUTO: 9 FL (ref 9.4–12.4)
POTASSIUM SERPL-SCNC: 3.6 MEQ/L (ref 3.5–5.2)
PRO-BNP: 119.6 PG/ML (ref 0–450)
RBC # BLD: 3.16 MILL/MM3 (ref 4.7–6.1)
SARS-COV-2, NAAT: NOT  DETECTED
SEG NEUTROPHILS: 75.2 %
SEGMENTED NEUTROPHILS ABSOLUTE COUNT: 6.4 THOU/MM3 (ref 1.8–7.7)
SODIUM BLD-SCNC: 141 MEQ/L (ref 135–145)
TOTAL PROTEIN: 7.1 G/DL (ref 6.1–8)
TROPONIN T: < 0.01 NG/ML
WBC # BLD: 8.5 THOU/MM3 (ref 4.8–10.8)

## 2022-05-03 PROCEDURE — 6360000002 HC RX W HCPCS: Performed by: EMERGENCY MEDICINE

## 2022-05-03 PROCEDURE — 6370000000 HC RX 637 (ALT 250 FOR IP): Performed by: PHYSICIAN ASSISTANT

## 2022-05-03 PROCEDURE — 99222 1ST HOSP IP/OBS MODERATE 55: CPT | Performed by: PHYSICIAN ASSISTANT

## 2022-05-03 PROCEDURE — 80053 COMPREHEN METABOLIC PANEL: CPT

## 2022-05-03 PROCEDURE — 93005 ELECTROCARDIOGRAM TRACING: CPT | Performed by: STUDENT IN AN ORGANIZED HEALTH CARE EDUCATION/TRAINING PROGRAM

## 2022-05-03 PROCEDURE — 6360000002 HC RX W HCPCS: Performed by: PHYSICIAN ASSISTANT

## 2022-05-03 PROCEDURE — 84484 ASSAY OF TROPONIN QUANT: CPT

## 2022-05-03 PROCEDURE — 96365 THER/PROPH/DIAG IV INF INIT: CPT

## 2022-05-03 PROCEDURE — 82077 ASSAY SPEC XCP UR&BREATH IA: CPT

## 2022-05-03 PROCEDURE — 87635 SARS-COV-2 COVID-19 AMP PRB: CPT

## 2022-05-03 PROCEDURE — 96376 TX/PRO/DX INJ SAME DRUG ADON: CPT

## 2022-05-03 PROCEDURE — 2580000003 HC RX 258: Performed by: PHYSICIAN ASSISTANT

## 2022-05-03 PROCEDURE — 1200000003 HC TELEMETRY R&B

## 2022-05-03 PROCEDURE — 85025 COMPLETE CBC W/AUTO DIFF WBC: CPT

## 2022-05-03 PROCEDURE — 99215 OFFICE O/P EST HI 40 MIN: CPT

## 2022-05-03 PROCEDURE — 96375 TX/PRO/DX INJ NEW DRUG ADDON: CPT

## 2022-05-03 PROCEDURE — 99285 EMERGENCY DEPT VISIT HI MDM: CPT

## 2022-05-03 PROCEDURE — 93010 ELECTROCARDIOGRAM REPORT: CPT | Performed by: INTERNAL MEDICINE

## 2022-05-03 PROCEDURE — 83880 ASSAY OF NATRIURETIC PEPTIDE: CPT

## 2022-05-03 PROCEDURE — 36415 COLL VENOUS BLD VENIPUNCTURE: CPT

## 2022-05-03 PROCEDURE — 83735 ASSAY OF MAGNESIUM: CPT

## 2022-05-03 PROCEDURE — 93005 ELECTROCARDIOGRAM TRACING: CPT | Performed by: NURSE PRACTITIONER

## 2022-05-03 PROCEDURE — 6360000002 HC RX W HCPCS: Performed by: STUDENT IN AN ORGANIZED HEALTH CARE EDUCATION/TRAINING PROGRAM

## 2022-05-03 PROCEDURE — 87804 INFLUENZA ASSAY W/OPTIC: CPT

## 2022-05-03 PROCEDURE — 6360000002 HC RX W HCPCS

## 2022-05-03 PROCEDURE — 71046 X-RAY EXAM CHEST 2 VIEWS: CPT

## 2022-05-03 RX ORDER — ASPIRIN 81 MG/1
81 TABLET, CHEWABLE ORAL DAILY
Status: DISCONTINUED | OUTPATIENT
Start: 2022-05-04 | End: 2022-05-05 | Stop reason: HOSPADM

## 2022-05-03 RX ORDER — ACETAMINOPHEN 325 MG/1
650 TABLET ORAL EVERY 6 HOURS PRN
Status: DISCONTINUED | OUTPATIENT
Start: 2022-05-03 | End: 2022-05-05 | Stop reason: HOSPADM

## 2022-05-03 RX ORDER — POLYETHYLENE GLYCOL 3350 17 G/17G
17 POWDER, FOR SOLUTION ORAL DAILY PRN
Status: DISCONTINUED | OUTPATIENT
Start: 2022-05-03 | End: 2022-05-05 | Stop reason: HOSPADM

## 2022-05-03 RX ORDER — LORAZEPAM 1 MG/1
3 TABLET ORAL
Status: DISCONTINUED | OUTPATIENT
Start: 2022-05-03 | End: 2022-05-03 | Stop reason: SDUPTHER

## 2022-05-03 RX ORDER — ONDANSETRON 2 MG/ML
INJECTION INTRAMUSCULAR; INTRAVENOUS
Status: COMPLETED
Start: 2022-05-03 | End: 2022-05-03

## 2022-05-03 RX ORDER — SODIUM CHLORIDE 0.9 % (FLUSH) 0.9 %
5-40 SYRINGE (ML) INJECTION PRN
Status: DISCONTINUED | OUTPATIENT
Start: 2022-05-03 | End: 2022-05-05 | Stop reason: HOSPADM

## 2022-05-03 RX ORDER — LORAZEPAM 1 MG/1
3 TABLET ORAL
Status: DISCONTINUED | OUTPATIENT
Start: 2022-05-03 | End: 2022-05-04

## 2022-05-03 RX ORDER — ACETAMINOPHEN 325 MG/1
650 TABLET ORAL ONCE
Status: DISCONTINUED | OUTPATIENT
Start: 2022-05-03 | End: 2022-05-03

## 2022-05-03 RX ORDER — ATORVASTATIN CALCIUM 40 MG/1
40 TABLET, FILM COATED ORAL NIGHTLY
Status: DISCONTINUED | OUTPATIENT
Start: 2022-05-03 | End: 2022-05-04

## 2022-05-03 RX ORDER — ENOXAPARIN SODIUM 100 MG/ML
40 INJECTION SUBCUTANEOUS DAILY
Status: DISCONTINUED | OUTPATIENT
Start: 2022-05-04 | End: 2022-05-05 | Stop reason: HOSPADM

## 2022-05-03 RX ORDER — LORAZEPAM 2 MG/ML
3 INJECTION INTRAMUSCULAR
Status: DISCONTINUED | OUTPATIENT
Start: 2022-05-03 | End: 2022-05-03 | Stop reason: SDUPTHER

## 2022-05-03 RX ORDER — SODIUM CHLORIDE 9 MG/ML
INJECTION, SOLUTION INTRAVENOUS PRN
Status: DISCONTINUED | OUTPATIENT
Start: 2022-05-03 | End: 2022-05-05 | Stop reason: HOSPADM

## 2022-05-03 RX ORDER — LORAZEPAM 1 MG/1
4 TABLET ORAL
Status: DISCONTINUED | OUTPATIENT
Start: 2022-05-03 | End: 2022-05-04

## 2022-05-03 RX ORDER — LORAZEPAM 2 MG/ML
3 INJECTION INTRAMUSCULAR
Status: DISCONTINUED | OUTPATIENT
Start: 2022-05-03 | End: 2022-05-04

## 2022-05-03 RX ORDER — SODIUM CHLORIDE 0.9 % (FLUSH) 0.9 %
5-40 SYRINGE (ML) INJECTION EVERY 12 HOURS SCHEDULED
Status: DISCONTINUED | OUTPATIENT
Start: 2022-05-03 | End: 2022-05-03 | Stop reason: SDUPTHER

## 2022-05-03 RX ORDER — SODIUM CHLORIDE 0.9 % (FLUSH) 0.9 %
5-40 SYRINGE (ML) INJECTION EVERY 12 HOURS SCHEDULED
Status: DISCONTINUED | OUTPATIENT
Start: 2022-05-03 | End: 2022-05-05 | Stop reason: HOSPADM

## 2022-05-03 RX ORDER — POTASSIUM CHLORIDE 7.45 MG/ML
10 INJECTION INTRAVENOUS PRN
Status: DISCONTINUED | OUTPATIENT
Start: 2022-05-03 | End: 2022-05-05 | Stop reason: HOSPADM

## 2022-05-03 RX ORDER — LORAZEPAM 2 MG/ML
4 INJECTION INTRAMUSCULAR
Status: DISCONTINUED | OUTPATIENT
Start: 2022-05-03 | End: 2022-05-04

## 2022-05-03 RX ORDER — MAGNESIUM SULFATE IN WATER 40 MG/ML
4000 INJECTION, SOLUTION INTRAVENOUS ONCE
Status: DISCONTINUED | OUTPATIENT
Start: 2022-05-03 | End: 2022-05-03

## 2022-05-03 RX ORDER — MAGNESIUM SULFATE IN WATER 40 MG/ML
2000 INJECTION, SOLUTION INTRAVENOUS PRN
Status: DISCONTINUED | OUTPATIENT
Start: 2022-05-03 | End: 2022-05-05 | Stop reason: HOSPADM

## 2022-05-03 RX ORDER — LORAZEPAM 2 MG/ML
4 INJECTION INTRAMUSCULAR
Status: DISCONTINUED | OUTPATIENT
Start: 2022-05-03 | End: 2022-05-03 | Stop reason: SDUPTHER

## 2022-05-03 RX ORDER — MAGNESIUM SULFATE IN WATER 40 MG/ML
2000 INJECTION, SOLUTION INTRAVENOUS ONCE
Status: COMPLETED | OUTPATIENT
Start: 2022-05-03 | End: 2022-05-03

## 2022-05-03 RX ORDER — LORAZEPAM 2 MG/ML
2 INJECTION INTRAMUSCULAR
Status: DISCONTINUED | OUTPATIENT
Start: 2022-05-03 | End: 2022-05-04

## 2022-05-03 RX ORDER — LORAZEPAM 2 MG/ML
1 INJECTION INTRAMUSCULAR
Status: DISCONTINUED | OUTPATIENT
Start: 2022-05-03 | End: 2022-05-04

## 2022-05-03 RX ORDER — LORAZEPAM 1 MG/1
2 TABLET ORAL
Status: DISCONTINUED | OUTPATIENT
Start: 2022-05-03 | End: 2022-05-03 | Stop reason: SDUPTHER

## 2022-05-03 RX ORDER — LORAZEPAM 1 MG/1
1 TABLET ORAL
Status: DISCONTINUED | OUTPATIENT
Start: 2022-05-03 | End: 2022-05-03 | Stop reason: SDUPTHER

## 2022-05-03 RX ORDER — POTASSIUM CHLORIDE 20 MEQ/1
40 TABLET, EXTENDED RELEASE ORAL PRN
Status: DISCONTINUED | OUTPATIENT
Start: 2022-05-03 | End: 2022-05-05 | Stop reason: HOSPADM

## 2022-05-03 RX ORDER — LORAZEPAM 2 MG/ML
1 INJECTION INTRAMUSCULAR
Status: DISCONTINUED | OUTPATIENT
Start: 2022-05-03 | End: 2022-05-03 | Stop reason: SDUPTHER

## 2022-05-03 RX ORDER — ACETAMINOPHEN 650 MG/1
650 SUPPOSITORY RECTAL EVERY 6 HOURS PRN
Status: DISCONTINUED | OUTPATIENT
Start: 2022-05-03 | End: 2022-05-05 | Stop reason: HOSPADM

## 2022-05-03 RX ORDER — LORAZEPAM 2 MG/ML
2 INJECTION INTRAMUSCULAR
Status: DISCONTINUED | OUTPATIENT
Start: 2022-05-03 | End: 2022-05-03 | Stop reason: SDUPTHER

## 2022-05-03 RX ORDER — SODIUM CHLORIDE 0.9 % (FLUSH) 0.9 %
5-40 SYRINGE (ML) INJECTION PRN
Status: DISCONTINUED | OUTPATIENT
Start: 2022-05-03 | End: 2022-05-03 | Stop reason: SDUPTHER

## 2022-05-03 RX ORDER — ONDANSETRON 2 MG/ML
4 INJECTION INTRAMUSCULAR; INTRAVENOUS EVERY 6 HOURS PRN
Status: DISCONTINUED | OUTPATIENT
Start: 2022-05-03 | End: 2022-05-05 | Stop reason: HOSPADM

## 2022-05-03 RX ORDER — LORAZEPAM 1 MG/1
1 TABLET ORAL
Status: DISCONTINUED | OUTPATIENT
Start: 2022-05-03 | End: 2022-05-04

## 2022-05-03 RX ORDER — LORAZEPAM 1 MG/1
2 TABLET ORAL
Status: DISCONTINUED | OUTPATIENT
Start: 2022-05-03 | End: 2022-05-04

## 2022-05-03 RX ORDER — ONDANSETRON 4 MG/1
4 TABLET, ORALLY DISINTEGRATING ORAL EVERY 8 HOURS PRN
Status: DISCONTINUED | OUTPATIENT
Start: 2022-05-03 | End: 2022-05-05 | Stop reason: HOSPADM

## 2022-05-03 RX ORDER — LORAZEPAM 1 MG/1
4 TABLET ORAL
Status: DISCONTINUED | OUTPATIENT
Start: 2022-05-03 | End: 2022-05-03 | Stop reason: SDUPTHER

## 2022-05-03 RX ORDER — LORAZEPAM 2 MG/ML
2 INJECTION INTRAMUSCULAR ONCE
Status: COMPLETED | OUTPATIENT
Start: 2022-05-03 | End: 2022-05-03

## 2022-05-03 RX ORDER — SODIUM CHLORIDE 9 MG/ML
INJECTION, SOLUTION INTRAVENOUS CONTINUOUS
Status: DISCONTINUED | OUTPATIENT
Start: 2022-05-03 | End: 2022-05-04

## 2022-05-03 RX ORDER — SODIUM CHLORIDE 9 MG/ML
INJECTION, SOLUTION INTRAVENOUS PRN
Status: DISCONTINUED | OUTPATIENT
Start: 2022-05-03 | End: 2022-05-03 | Stop reason: SDUPTHER

## 2022-05-03 RX ORDER — LORAZEPAM 2 MG/ML
1 INJECTION INTRAMUSCULAR ONCE
Status: COMPLETED | OUTPATIENT
Start: 2022-05-03 | End: 2022-05-03

## 2022-05-03 RX ADMIN — LORAZEPAM 1 MG: 2 INJECTION INTRAMUSCULAR; INTRAVENOUS at 20:59

## 2022-05-03 RX ADMIN — ACETAMINOPHEN 650 MG: 325 TABLET ORAL at 23:23

## 2022-05-03 RX ADMIN — MAGNESIUM SULFATE HEPTAHYDRATE 2000 MG: 40 INJECTION, SOLUTION INTRAVENOUS at 17:21

## 2022-05-03 RX ADMIN — SODIUM CHLORIDE, PRESERVATIVE FREE 10 ML: 5 INJECTION INTRAVENOUS at 23:30

## 2022-05-03 RX ADMIN — LORAZEPAM 2 MG: 2 INJECTION INTRAMUSCULAR; INTRAVENOUS at 17:41

## 2022-05-03 RX ADMIN — LORAZEPAM 1 MG: 2 INJECTION INTRAMUSCULAR; INTRAVENOUS at 23:53

## 2022-05-03 RX ADMIN — ATORVASTATIN CALCIUM 40 MG: 40 TABLET, FILM COATED ORAL at 23:19

## 2022-05-03 RX ADMIN — LORAZEPAM 1 MG: 2 INJECTION INTRAMUSCULAR; INTRAVENOUS at 16:00

## 2022-05-03 RX ADMIN — MAGNESIUM SULFATE HEPTAHYDRATE 2000 MG: 40 INJECTION, SOLUTION INTRAVENOUS at 17:26

## 2022-05-03 RX ADMIN — SODIUM CHLORIDE: 9 INJECTION, SOLUTION INTRAVENOUS at 23:19

## 2022-05-03 RX ADMIN — ONDANSETRON 4 MG: 2 INJECTION INTRAMUSCULAR; INTRAVENOUS at 20:56

## 2022-05-03 RX ADMIN — MAGNESIUM SULFATE HEPTAHYDRATE 4000 MG: 40 INJECTION, SOLUTION INTRAVENOUS at 17:08

## 2022-05-03 ASSESSMENT — PAIN DESCRIPTION - ORIENTATION
ORIENTATION: LEFT
ORIENTATION: RIGHT;LEFT
ORIENTATION: LEFT

## 2022-05-03 ASSESSMENT — ENCOUNTER SYMPTOMS
SINUS PRESSURE: 0
SINUS PAIN: 0
VOMITING: 0
CHEST TIGHTNESS: 0
VOMITING: 1
CONSTIPATION: 0
ABDOMINAL DISTENTION: 0
ABDOMINAL PAIN: 0
NAUSEA: 1
DIARRHEA: 0
SORE THROAT: 0
COUGH: 0
NAUSEA: 0
COUGH: 1
SHORTNESS OF BREATH: 1
EYE ITCHING: 0
SHORTNESS OF BREATH: 0
COLOR CHANGE: 0
EYES NEGATIVE: 1
EYE PAIN: 0
EYE DISCHARGE: 0
RHINORRHEA: 0
EYE REDNESS: 0
ALLERGIC/IMMUNOLOGIC NEGATIVE: 1

## 2022-05-03 ASSESSMENT — PAIN DESCRIPTION - PAIN TYPE: TYPE: ACUTE PAIN

## 2022-05-03 ASSESSMENT — PAIN SCALES - GENERAL
PAINLEVEL_OUTOF10: 8
PAINLEVEL_OUTOF10: 8
PAINLEVEL_OUTOF10: 7

## 2022-05-03 ASSESSMENT — PAIN DESCRIPTION - LOCATION
LOCATION: CHEST
LOCATION: CHEST
LOCATION: LEG

## 2022-05-03 ASSESSMENT — PAIN - FUNCTIONAL ASSESSMENT
PAIN_FUNCTIONAL_ASSESSMENT: 0-10
PAIN_FUNCTIONAL_ASSESSMENT: 0-10

## 2022-05-03 ASSESSMENT — PAIN DESCRIPTION - FREQUENCY: FREQUENCY: CONTINUOUS

## 2022-05-03 ASSESSMENT — PAIN DESCRIPTION - DESCRIPTORS
DESCRIPTORS: OTHER (COMMENT)
DESCRIPTORS: ACHING

## 2022-05-03 NOTE — ED TRIAGE NOTES
RN into room, pt's HR increased to 195-205 bpm. Dr. Omer Flores at bedside for assessment. EKG completed and given to Dr. Omer Flores. Vagal maneuver attempted with blowing into syringe and lying on back with feet in the air. Pt's HR dropped to 85bpm. EKG completed and given to Dr. Omer Flores.

## 2022-05-03 NOTE — ED NOTES
Dr. Claudia Riggs updated on CIWA score of 6. Protocol ativan orders placed. No ativan warranted at this time per protocol. Pt remains on cardiac monitor. Vitals updated.       Ruth Dleuca RN  05/03/22 [id number]

## 2022-05-03 NOTE — ED NOTES
Pt resting on cot, no visible signs of distress noted. Urine output collectively 1800 ml of clear yellow urine. Pt remains on monitor w/fiance at bedside and call light within reach.      Paul Champion RN  05/03/22 [id number]

## 2022-05-03 NOTE — ED PROVIDER NOTES
[place] ENCOUNTER          Pt Name: Brandon Lazcano  MRN: [id number]   [place] 1990  Date of evaluation: 5/3/2022  Treating Resident Physician: Nciole Santiago DO  Supervising Physician: Maddy Lowe, [facility]       Chief Complaint   Patient presents with    Chest Pain    Cough     History obtained from the patient. HISTORY OF PRESENT ILLNESS    HPI  Brandon Lazcano is a 33 y.o. male with a past medical history of sickle cell trait, asthma, and bronchitis who presents to the emergency department for evaluation of chest pain. The patient states that he had sudden onset chest pain today at 1 PM while sitting at home and watching TV. He further characterizes his chest pain is located to the left side of his chest, constant in duration, with a current severity of 7/10, and worse with deep and fast breathing. He reports that he smokes 1 pack/day of cigarettes, drinks 2 pints of ru with 6-7 [employer]eps[employer] per day, and smokes marijuana most recently yesterday. He reports that his last drink was last night. He reports associated lightheadedness and dizziness, shortness of breath, and nausea and vomiting x1 today. He reports chronic cough and congestion. The patient has no other acute complaints at this time. REVIEW OF SYSTEMS   Review of Systems   Constitutional: Negative for fever. HENT: Positive for congestion. Negative for ear pain, rhinorrhea, sinus pressure and sinus pain. Eyes: Negative for pain, discharge, redness and itching. Respiratory: Positive for cough and shortness of breath. Negative for chest tightness. Cardiovascular: Positive for chest pain. Negative for palpitations. Gastrointestinal: Positive for nausea and vomiting. Negative for abdominal distention, abdominal pain, constipation and diarrhea. Genitourinary: Negative for dysuria, frequency, hematuria and urgency.    Musculoskeletal: Negative for arthralgias. Skin: Negative for color change. Neurological: Positive for dizziness and light-headedness. Negative for syncope.          PAST MEDICAL AND SURGICAL HISTORY     Past Medical History:   Diagnosis Date    Alcohol abuse 5/3/2022     Past Surgical History:   Procedure Laterality Date    KNEE SURGERY Left          MEDICATIONS     Current Facility-Administered Medications:     sodium chloride flush 0.9 % injection 5-40 mL, 5-40 mL, IntraVENous, 2 times per day, [employer], PA-C    sodium chloride flush 0.9 % injection 5-40 mL, 5-40 mL, IntraVENous, PRN, [employer], PA-C    0.9 % sodium chloride infusion, , IntraVENous, PRN, [employer], PA-C    [START ON 5/4/2022] enoxaparin (LOVENOX) injection 40 mg, 40 mg, SubCUTAneous, Daily, LEONORA VelascoC    ondansetron (ZOFRAN-ODT) disintegrating tablet 4 mg, 4 mg, Oral, Q8H PRN **OR** ondansetron (ZOFRAN) injection 4 mg, 4 mg, IntraVENous, Q6H PRN, Zeina Bruno PA-C    polyethylene glycol (GLYCOLAX) packet 17 g, 17 g, Oral, Daily PRN, [employer], PA-C    acetaminophen (TYLENOL) tablet 650 mg, 650 mg, Oral, Q6H PRN **OR** acetaminophen (TYLENOL) suppository 650 mg, 650 mg, Rectal, Q6H PRN, Zeina Bruno PA-C    0.9 % sodium chloride infusion, , IntraVENous, Continuous, TRAVON Velasco-C    potassium chloride (KLOR-CON M) extended release tablet 40 mEq, 40 mEq, Oral, PRN **OR** potassium bicarb-citric acid (EFFER-K) effervescent tablet 40 mEq, 40 mEq, Oral, PRN **OR** potassium chloride 10 mEq/100 mL IVPB (Peripheral Line), 10 mEq, IntraVENous, PRN, [employer], PA-C    magnesium sulfate 2000 mg in 50 mL IVPB premix, 2,000 mg, IntraVENous, PRN, [employer], PA-C    LORazepam (ATIVAN) tablet 1 mg, 1 mg, Oral, Q1H PRN **OR** LORazepam (ATIVAN) injection 1 mg, 1 mg, IntraVENous, Q1H PRN, 1 mg at 05/03/22 2059 **OR** LORazepam (ATIVAN) tablet 2 mg, 2 mg, Oral, Q1H PRN **OR** LORazepam (ATIVAN) injection 2 mg, 2 mg, IntraVENous, Q1H PRN **OR** LORazepam (ATIVAN) tablet 3 mg, 3 mg, Oral, Q1H PRN **OR** LORazepam (ATIVAN) injection 3 mg, 3 mg, IntraVENous, Q1H PRN **OR** LORazepam (ATIVAN) tablet 4 mg, 4 mg, Oral, Q1H PRN **OR** LORazepam (ATIVAN) injection 4 mg, 4 mg, IntraVENous, Q1H PRN, [employer], PA-C    [START ON 5/4/2022] aspirin chewable tablet 81 mg, 81 mg, Oral, Daily, Zeina R Brown, PA-C    atorvastatin (LIPITOR) tablet 40 mg, 40 mg, Oral, Nightly, [employer], PA-C      SOCIAL HISTORY     Social History     Social History Narrative    Not on file     Social History     Tobacco Use    Smoking status: Current Every Day Smoker     Packs/day: 1.00     Years: 12.00     Pack years: 12.00     Types: Cigars, Cigarettes     Start date: 2/5/2009    Smokeless tobacco: Never Used   Vaping Use    Vaping Use: Never used   Substance Use Topics    Alcohol use: Yes     Comment: 2-3 pints a day - trying to stop. Last drink was 1/31/21.  Drug use: Yes     Types: Marijuana (Weed)         ALLERGIES   No Known Allergies      FAMILY HISTORY     Family History   Problem Relation Age of Onset    High Blood Pressure Maternal Aunt     Diabetes Maternal Uncle     Sickle Cell Anemia Maternal Grandmother     Arthritis Neg Hx          PREVIOUS RECORDS   Previous records reviewed        PHYSICAL EXAM     ED Triage Vitals [05/03/22 1421]   BP Temp Temp src Pulse Resp SpO2 Height Weight   (!) 188/108 98.1 °F (36.7 °C) -- 91 20 96 % 6' (1.829 m) 162 lb (73.5 kg)     Initial vital signs and nursing assessment reviewed and abnormal from HTN. Body mass index is 21.97 kg/m². Pulsoximetry is normal per my interpretation. Additional Vital Signs:  Vitals:    05/03/22 2057   BP: (!) 149/108   Pulse: 99   Resp: 12   Temp:    SpO2: 99%       Physical Exam  Constitutional:       General: He is not in acute distress. Appearance: Normal appearance. He is not ill-appearing. HENT:      Head: Normocephalic and atraumatic.       Nose: Nose normal. No congestion or rhinorrhea. Mouth/Throat:      Mouth: Mucous membranes are moist.      Pharynx: Oropharynx is clear. No oropharyngeal exudate or posterior oropharyngeal erythema. Eyes:      Extraocular Movements: Extraocular movements intact. Pupils: Pupils are equal, round, and reactive to light. Cardiovascular:      Rate and Rhythm: Normal rate and regular rhythm. Pulses: Normal pulses. Heart sounds: Normal heart sounds. Pulmonary:      Effort: Pulmonary effort is normal. No respiratory distress. Breath sounds: Normal breath sounds. Abdominal:      General: Abdomen is flat. There is no distension. Palpations: Abdomen is soft. Tenderness: There is no abdominal tenderness. Musculoskeletal:         General: No swelling or tenderness. Normal range of motion. Cervical back: Normal range of motion and neck supple. Right lower leg: No edema. Left lower leg: No edema. Comments: The patient reports that his bilateral ankles feel \"tight\" during palpation. Skin:     General: Skin is warm and dry. Capillary Refill: Capillary refill takes less than 2 seconds. Neurological:      General: No focal deficit present. Mental Status: He is alert and oriented to person, place, and time. Mental status is at baseline. Comments: Slight extremity tremor at rest.  Possible lingual tremor. MEDICAL DECISION MAKING   Initial Assessment:   3 69-year-old male presenting to the emergency department for evaluation of chest pain. 2. Differential diagnosis includes but is not limited to: Anemia, electrolyte abnormality, ACS, pneumothorax, URI, alcohol withdrawal.  3. He is low risk for pulmonary embolism by the [facility] criteria. Plan:    IV, labs.  Cardiac work-up.  Ativan.         ED RESULTS   Laboratory results:  Labs Reviewed   CBC WITH AUTO DIFFERENTIAL - Abnormal; Notable for the following components:       Result Value    RBC 3.16 (*) Hemoglobin 11.5 (*)     Hematocrit 33.0 (*)     .4 (*)     MCH 36.4 (*)     RDW-CV 15.0 (*)     RDW-SD 57.6 (*)     MPV 9.0 (*)     All other components within normal limits   COMPREHENSIVE METABOLIC PANEL - Abnormal; Notable for the following components:    AST 88 (*)     ALT 93 (*)     All other components within normal limits   MAGNESIUM - Abnormal; Notable for the following components:    Magnesium 1.0 (*)     All other components within normal limits   RAPID INFLUENZA A/B ANTIGENS   COVID-19, RAPID   TROPONIN   BRAIN NATRIURETIC PEPTIDE   ANION GAP   OSMOLALITY   GLOMERULAR FILTRATION RATE, ESTIMATED   ETHANOL   BASIC METABOLIC PANEL W/ REFLEX TO MG FOR LOW K   CBC WITH AUTO DIFFERENTIAL   TROPONIN   TROPONIN       Radiologic studies results:  XR CHEST (2 VW)   Final Result   Normal chest. No acute findings. **This report has been created using voice recognition software. It may contain minor errors which are inherent in voice recognition technology. **      Final report electronically signed by Dr. Makayla Galo on 5/3/2022 4:24 PM          ED Medications administered this visit:   Medications   sodium chloride flush 0.9 % injection 5-40 mL (has no administration in time range)   sodium chloride flush 0.9 % injection 5-40 mL (has no administration in time range)   0.9 % sodium chloride infusion (has no administration in time range)   enoxaparin (LOVENOX) injection 40 mg (has no administration in time range)   ondansetron (ZOFRAN-ODT) disintegrating tablet 4 mg (has no administration in time range)     Or   ondansetron (ZOFRAN) injection 4 mg (has no administration in time range)   polyethylene glycol (GLYCOLAX) packet 17 g (has no administration in time range)   acetaminophen (TYLENOL) tablet 650 mg (has no administration in time range)     Or   acetaminophen (TYLENOL) suppository 650 mg (has no administration in time range)   0.9 % sodium chloride infusion (has no administration in time range)   potassium chloride (KLOR-CON M) extended release tablet 40 mEq (has no administration in time range)     Or   potassium bicarb-citric acid (EFFER-K) effervescent tablet 40 mEq (has no administration in time range)     Or   potassium chloride 10 mEq/100 mL IVPB (Peripheral Line) (has no administration in time range)   magnesium sulfate 2000 mg in 50 mL IVPB premix (has no administration in time range)   LORazepam (ATIVAN) tablet 1 mg ( Oral See Alternative 5/3/22 2059)     Or   LORazepam (ATIVAN) injection 1 mg (1 mg IntraVENous Given 5/3/22 2059)     Or   LORazepam (ATIVAN) tablet 2 mg ( Oral See Alternative 5/3/22 2059)     Or   LORazepam (ATIVAN) injection 2 mg ( IntraVENous See Alternative 5/3/22 2059)     Or   LORazepam (ATIVAN) tablet 3 mg ( Oral See Alternative 5/3/22 2059)     Or   LORazepam (ATIVAN) injection 3 mg ( IntraVENous See Alternative 5/3/22 2059)     Or   LORazepam (ATIVAN) tablet 4 mg ( Oral See Alternative 5/3/22 2059)     Or   LORazepam (ATIVAN) injection 4 mg ( IntraVENous See Alternative 5/3/22 2059)   aspirin chewable tablet 81 mg (has no administration in time range)   atorvastatin (LIPITOR) tablet 40 mg (has no administration in time range)   LORazepam (ATIVAN) injection 1 mg (1 mg IntraVENous Given 5/3/22 1600)   magnesium sulfate 2000 mg in 50 mL IVPB premix (0 mg IntraVENous Stopped 5/3/22 1750)   magnesium sulfate 2000 mg in 50 mL IVPB premix (0 mg IntraVENous Stopped 5/3/22 2000)   LORazepam (ATIVAN) injection 2 mg (2 mg IntraVENous Given 5/3/22 1741)   ondansetron (ZOFRAN) 4 MG/2ML injection (4 mg  Given 5/3/22 2056)         ED COURSE     ED Course as of 05/03/22 2237   Tue May 03, 2022   1709 The patient was observed to spontaneously enter SVT with a heart rate in the low 200s. The patient was reassessed and was diaphoretic. Blood pressure normal.  REVERT modified vagal maneuver performed with the assistance of Dr. Catie Wynn with conversion to sinus tachycardia.   Discussed with ED pharmacist regarding faster magnesium repletion, which was changed to a 2 g bolus over 15 minutes followed by a 2 g infusion over 2 hours. [DO]   4444 This case was discussed with the admitting hospitalist, ROSALINDA AT [place], [place], who accepted the patient for admission to the hospital.  I discussed admission to the hospital with the patient who verbalized understanding and all questions were answered. Patient placed on CIWA protocol prior to admission to the hospital. [DO]      ED Course User Index  [DO] Brenda Patel DO       MEDICATION CHANGES     Current Discharge Medication List            FINAL DISPOSITION     Final diagnoses:   Chest pain, unspecified type   Paroxysmal supraventricular tachycardia ([address]   Hypertension, unspecified type   Hypomagnesemia     Condition: condition: stable  Dispo: Admit to med/surg floor      This transcription was electronically signed. Parts of this transcriptions may have been dictated by use of voice recognition software and electronically transcribed, and parts may have been transcribed with the assistance of an ED scribe. The transcription may contain errors not detected in proofreading. Please refer to my supervising physician's documentation if my documentation differs.     Electronically Signed: Brenda Patel DO, 05/03/22, 10:37 PM     Brenda Patel DO  Resident  05/03/22 [id number]

## 2022-05-03 NOTE — ED NOTES
Pt resting on cot; no visible signs of distress noted. Vitals  Updated. Fiance remains at bedside. Call light within reach.       Shon Cobian RN  05/03/22 7316

## 2022-05-03 NOTE — ED NOTES
Pt to ED via private vehicle from urgent care for further evaluation of elevated BP and chest pain. Pt rprts pain started today at around 1300 while at rest. Rprts left sided cp w/o radiation. Pt rprts intermittent episodes of lower extremity swelling, none at this time. Pt also rprts daily alcohol intake, approximately 1-2 pints of ru; [place], but no alcohol intake today. Pt rprts \"just not feeling it today. \" CIWA screening completed. Vitals updated. Pt placed on cardiac monitor and in gown. No repeat EKG completed per Dr. Jose Young; one just completed at urgent care. Dr. Jacqueline Perdomo; resident physician at bedside to assess.       Kong Silva, RN  05/03/22 2559

## 2022-05-03 NOTE — ED PROVIDER NOTES
[facility]gent [facility] Encounter       CHIEF COMPLAINT       Chief Complaint   Patient presents with    Chest Pain       Nurses Notes reviewed and I agree except as noted in the HPI. HISTORY OF PRESENT ILLNESS   Mannie Araujo is a 33 y.o. male who presents for evaluation of sudden onset of left-sided and substernal chest pain. Patient states that the symptoms began roughly 1 hour before arrival to urgent care. States that he has been noticing issues with high blood pressure as well. States that he was seen in the emergency department 4 days ago for leg swelling and had a cardiac work-up done that was benign but states that the chest pain is new. He denies any significant shortness of breath or any upper respiratory symptoms. The history is provided by the patient. REVIEW OF SYSTEMS     Review of Systems   Constitutional: Negative for chills and fever. HENT: Negative for congestion and sore throat. Respiratory: Negative for cough and shortness of breath. Cardiovascular: Positive for chest pain. Gastrointestinal: Negative for nausea and vomiting. Musculoskeletal: Negative for arthralgias and myalgias. Skin: Negative for rash. Allergic/Immunologic: Negative for immunocompromised state. Neurological: Negative for headaches. PAST MEDICAL HISTORY   History reviewed. No pertinent past medical history. SURGICALHISTORY     Patient  has a past surgical history that includes knee surgery (Left). CURRENT MEDICATIONS       Previous Medications    MULTIPLE VITAMINS-MINERALS (THERAPEUTIC MULTIVITAMIN-MINERALS) TABLET    Take 1 tablet by mouth daily       ALLERGIES     Patient is has No Known Allergies. Patients   There is no immunization history on file for this patient.     FAMILY HISTORY     Patient's family history includes Diabetes in his maternal uncle; High Blood Pressure in his maternal aunt; Sickle Cell Anemia in his maternal grandmother. SOCIAL HISTORY     Patient  reports that he has been smoking cigars and cigarettes. He started smoking about 13 years ago. He has a 12.00 pack-year smoking history. He has never used smokeless tobacco. He reports current alcohol use. He reports current drug use. Drug: Marijuana Ulices Relic). PHYSICAL EXAM     ED TRIAGE VITALS  BP: (!) 188/108, Temp: 98.1 °F (36.7 °C), Pulse: 91, Resp: 20, SpO2: 96 %,Estimated body mass index is 21.97 kg/m² as calculated from the following:    Height as of this encounter: 6' (1.829 m). Weight as of this encounter: 162 lb (73.5 kg). ,No LMP for male patient. Physical Exam  Vitals and nursing note reviewed. Constitutional:       General: He is not in acute distress. Appearance: He is well-developed. He is not diaphoretic. Eyes:      Conjunctiva/sclera:      Right eye: Right conjunctiva is not injected. Left eye: Left conjunctiva is not injected. Pupils: Pupils are equal.   Cardiovascular:      Rate and Rhythm: Normal rate and regular rhythm. Heart sounds: No murmur heard. Pulmonary:      Effort: Pulmonary effort is normal. No respiratory distress. Breath sounds: Normal breath sounds. Musculoskeletal:      Cervical back: Normal range of motion. Skin:     General: Skin is warm. Findings: No rash. Neurological:      Mental Status: He is alert and oriented to person, place, and time. Psychiatric:         Behavior: Behavior normal.         DIAGNOSTIC RESULTS     Labs:No results found for this visit on 05/03/22. IMAGING:    No orders to display         EKG:  Sinus rhythm with short TX interval, rate 93    URGENT CARE COURSE:     Vitals:    05/03/22 1421   BP: (!) 188/108   Pulse: 91   Resp: 20   Temp: 98.1 °F (36.7 °C)   SpO2: 96%   Weight: 162 lb (73.5 kg)   Height: 6' (1.829 m)       Medications - No data to display         PROCEDURES:  None    FINAL IMPRESSION      1. Chest pain, unspecified type    2.  Hypertension, unspecified type          DISPOSITION/ PLAN       I discussed with the patient that based on his blood pressure and current chest pain symptoms, I believe he needs to be transferred to the emergency department for cardiac evaluation. I did discuss with the patient that I would prefer EMS transfer, however he states that he will get a ride with a family member that brought him here. He is advised to present directly to the ER. Patient states that he prefer to go to [facility] and report was called to Carlita Vargas. PATIENT REFERRED TO:  No primary care provider on file. No primary physician on file.       DISCHARGE MEDICATIONS:  New Prescriptions    No medications on file       Discontinued Medications    No medications on file       Current Discharge Medication List          JEOVANY Luna CNP    (Please note that portions of this note were completed with a voice recognition program. Efforts were made to edit the dictations but occasionally words are mis-transcribed.)          JEOVANY Luna CNP  05/03/22 9201

## 2022-05-03 NOTE — H&P
Hospitalist - History & Physical      Patient: Jin Lopes    Unit/Bed:TARIQ /TARIQ  YOB: 1990  MRN: [id number]   Acct: [de-identified]   PCP: None Provider      Assessment and Plan:        1. Hypomagnesemia:   a. Replacement protocol  2. Alcohol abuse:   a. CIWA  b. Social work consult to discuss inpatient/outpatient treatment options  3. SVT:   a. Paroxysmal   b. Resolved with vagal maneuver in the ED  c. Telemetry   d. Possibly due to hypomag? 4. Chest pain  a. ACS rule out  b. Serial troponin  c. Start statin, ASA      CC:  Chest pain    HPI: Patient presents to the ED with chest pain on the left side worse with cough and deep breathing. During his ED course the patient was found to have low magnesium. The patient also had an episode of SVT that resolved with vagal maneuvers. The patient reports being a heavy daily drinker with a desire to quit drinking as well. Patient is admitted for further evaluation of chest pain, magnesium replacement, and alcohol withdrawal management. ROS: Review of Systems   Constitutional: Positive for activity change. HENT: Positive for congestion. Eyes: Negative. Respiratory: Positive for cough. Cardiovascular: Positive for chest pain and palpitations. Gastrointestinal: Positive for nausea and vomiting. Endocrine: Negative. Genitourinary: Negative. Musculoskeletal: Negative. Skin: Negative. Allergic/Immunologic: Negative. Neurological: Positive for dizziness. Hematological: Negative. Psychiatric/Behavioral: Negative.       PMH:    Past Medical History:   Diagnosis Date    Alcohol abuse 5/3/2022     SHX:    Social History     Socioeconomic History    Marital status: Single     Spouse name: Not on file    Number of children: Not on file    Years of education: Not on file    Highest education level: Not on file   Occupational History    Occupation: [employer]   Tobacco Use    Smoking status: Current Every Day Smoker     Packs/day: 1.00     Years: 12.00     Pack years: 12.00     Types: Cigars, Cigarettes     Start date: 2/5/2009    Smokeless tobacco: Never Used   Vaping Use    Vaping Use: Never used   Substance and Sexual Activity    Alcohol use: Yes     Comment: 2-3 pints a day - trying to stop. Last drink was 1/31/21.  Drug use: Yes     Types: Marijuana Roc Parrobert)    Sexual activity: Not on file   Other Topics Concern    Not on file   Social History Narrative    Not on file     Social Determinants of Health     Financial Resource Strain:     Difficulty of Paying Living Expenses: Not on file   Food Insecurity:     Worried About Running Out of Food in the Last Year: Not on file    Gaby of Food in the Last Year: Not on file   Transportation Needs:     Lack of Transportation (Medical): Not on file    Lack of Transportation (Non-Medical):  Not on file   Physical Activity:     Days of Exercise per Week: Not on file    Minutes of Exercise per Session: Not on file   Stress:     Feeling of Stress : Not on file   Social Connections:     Frequency of Communication with Friends and Family: Not on file    Frequency of Social Gatherings with Friends and Family: Not on file    Attends [employer] Services: Not on file    Active Member of [address] [place] [address] [employer] or Organizations: Not on file    Attends Club or Organization Meetings: Not on file    Marital Status: Not on file   Intimate Partner Violence:     Fear of Current or Ex-Partner: Not on file    Emotionally Abused: Not on file    Physically Abused: Not on file    Sexually Abused: Not on file   Housing Stability:     Unable to Pay for Housing in the Last Year: Not on file    Number of [place] in the Last Year: Not on file    Unstable Housing in the Last Year: Not on file     FHX:   Family History   Problem Relation Age of Onset    High Blood Pressure Maternal Aunt     Diabetes Maternal Uncle     Sickle Cell Anemia Maternal Grandmother     Arthritis Neg Hx Allergies: No Known Allergies  Medications:     sodium chloride      sodium chloride        sodium chloride flush  5-40 mL IntraVENous 2 times per day    [START ON 5/4/2022] enoxaparin  40 mg SubCUTAneous Daily     sodium chloride flush, 5-40 mL, PRN  sodium chloride, , PRN  ondansetron, 4 mg, Q8H PRN   Or  ondansetron, 4 mg, Q6H PRN  polyethylene glycol, 17 g, Daily PRN  acetaminophen, 650 mg, Q6H PRN   Or  acetaminophen, 650 mg, Q6H PRN  potassium chloride, 40 mEq, PRN   Or  potassium alternative oral replacement, 40 mEq, PRN   Or  potassium chloride, 10 mEq, PRN  magnesium sulfate, 2,000 mg, PRN  LORazepam, 1 mg, Q1H PRN   Or  LORazepam, 1 mg, Q1H PRN   Or  LORazepam, 2 mg, Q1H PRN   Or  LORazepam, 2 mg, Q1H PRN   Or  LORazepam, 3 mg, Q1H PRN   Or  LORazepam, 3 mg, Q1H PRN   Or  LORazepam, 4 mg, Q1H PRN   Or  LORazepam, 4 mg, Q1H PRN        Labs:   Recent Results (from the past 24 hour(s))   EKG 12 Lead    Collection Time: 05/03/22  2:21 PM   Result Value Ref Range    Ventricular Rate 93 BPM    Atrial Rate 93 BPM    P-R Interval 86 ms    QRS Duration 106 ms    Q-T Interval 348 ms    QTc Calculation (Bazett) 432 ms    P Axis 63 degrees    R Axis 50 degrees    T Axis 97 degrees   CBC with Auto Differential    Collection Time: 05/03/22  3:10 PM   Result Value Ref Range    WBC 8.5 4.8 - 10.8 thou/mm3    RBC 3.16 (L) 4.70 - 6.10 mill/mm3    Hemoglobin 11.5 (L) 14.0 - 18.0 gm/dl    Hematocrit 33.0 (L) 42.0 - 52.0 %    .4 (H) 80.0 - 94.0 fL    MCH 36.4 (H) 26.0 - 33.0 pg    MCHC 34.8 32.2 - 35.5 gm/dl    RDW-CV 15.0 (H) 11.5 - 14.5 %    RDW-SD 57.6 (H) 35.0 - 45.0 fL    Platelets 239 038 - 816 thou/mm3    MPV 9.0 (L) 9.4 - 12.4 fL    Seg Neutrophils 75.2 %    Lymphocytes 13.7 %    Monocytes 9.5 %    Eosinophils 0.4 %    Basophils 0.7 %    Immature Granulocytes 0.5 %    Segs Absolute 6.4 1.8 - 7.7 thou/mm3    Lymphocytes Absolute 1.2 1.0 - 4.8 thou/mm3    Monocytes Absolute 0.8 0.4 - 1.3 thou/mm3 Eosinophils Absolute 0.0 0.0 - 0.4 thou/mm3    Basophils Absolute 0.1 0.0 - 0.1 thou/mm3    Immature Grans (Abs) 0.04 0.00 - 0.07 thou/mm3    nRBC 0 /100 wbc   Troponin    Collection Time: 05/03/22  3:10 PM   Result Value Ref Range    Troponin T < 0.010 ng/ml   Brain Natriuretic Peptide    Collection Time: 05/03/22  3:10 PM   Result Value Ref Range    Pro-.6 0.0 - 450.0 pg/mL   Comprehensive Metabolic Panel    Collection Time: 05/03/22  3:10 PM   Result Value Ref Range    Glucose 93 70 - 108 mg/dL    CREATININE 0.7 0.4 - 1.2 mg/dL    BUN 7 7 - 22 mg/dL    Sodium 141 135 - 145 meq/L    Potassium 3.6 3.5 - 5.2 meq/L    Chloride 100 98 - 111 meq/L    CO2 25 23 - 33 meq/L    Calcium 9.2 8.5 - 10.5 mg/dL    AST 88 (H) 5 - 40 U/L    Alkaline Phosphatase 119 38 - 126 U/L    Total Protein 7.1 6.1 - 8.0 g/dL    Albumin 4.2 3.5 - 5.1 g/dL    Total Bilirubin 0.5 0.3 - 1.2 mg/dL    ALT 93 (H) 11 - 66 U/L   Magnesium    Collection Time: 05/03/22  3:10 PM   Result Value Ref Range    Magnesium 1.0 (L) 1.6 - 2.4 mg/dL   Anion Gap    Collection Time: 05/03/22  3:10 PM   Result Value Ref Range    Anion Gap 16.0 8.0 - 16.0 meq/L   Osmolality    Collection Time: 05/03/22  3:10 PM   Result Value Ref Range    Osmolality Calc 278.9 275.0 - 300.0 mOsmol/kg   Glomerular Filtration Rate, Estimated    Collection Time: 05/03/22  3:10 PM   Result Value Ref Range    Est, Glom Filt Rate >90 ml/min/1.73m2   Ethanol    Collection Time: 05/03/22  3:10 PM   Result Value Ref Range    ETHYL ALCOHOL, SERUM 0.03 0.00 %   Rapid influenza A/B antigens    Collection Time: 05/03/22  4:00 PM    Specimen: Nasopharyngeal   Result Value Ref Range    Flu A Antigen Negative NEGATIVE    Flu B Antigen Negative NEGATIVE   COVID-19, Rapid    Collection Time: 05/03/22  4:00 PM   Result Value Ref Range    SARS-CoV-2, NAAT NOT  DETECTED NOT DETECTED   EKG 12 Lead    Collection Time: 05/03/22  4:49 PM   Result Value Ref Range    Ventricular Rate 204 BPM    QRS Duration 72 ms    Q-T Interval 222 ms    QTc Calculation (Bazett) 409 ms    R Axis 78 degrees    T Axis -97 degrees   EKG 12 Lead    Collection Time: 05/03/22  4:52 PM   Result Value Ref Range    Ventricular Rate 97 BPM    Atrial Rate 97 BPM    P-R Interval 72 ms    QRS Duration 154 ms    Q-T Interval 376 ms    QTc Calculation (Bazett) 477 ms    R Axis 70 degrees    T Axis -166 degrees         Vital Signs: T: 98.1F P: 91 RR: 14 B/P: 148/115: FiO2: RA: O2 Sat:100%: I/O: No intake or output data in the 24 hours ending 05/03/22 2155      General:   No acute distress  HEENT:  normocephalic and atraumatic. No scleral icterus. PEARLA, mucous membranes moist  Neck: supple. Trachea midline. No JVD. Full ROM, no meningismus. Lungs: clear to auscultation. No retractions, no accessory muscle use. Cardiac: RRR, no murmur, 2+ pulses  Abdomen: soft. Nontender. Bowel sounds active  Extremities:  No clubbing, cyanosis x 4, no edema    Vasculature: capillary refill < 3 seconds. Skin:  warm and dry. no visible rashes  Psych:  Alert and oriented x3. Affect appropriate  Lymph:  No supraclavicular adenopathy. Neurologic:  CN II-XII grossly intact. No focal deficit. Data: (All radiographs, tracings, PFTs, and imaging are personally viewed and interpreted unless otherwise noted).     EKG: rhythm: normal sinus rhythm, rate=97 bpm, pr=72 ms, wtl=690 ms, cw=532 ms, axis=70 degrees        Electronically signed by  Nancy Walker PA-C

## 2022-05-03 NOTE — ED TRIAGE NOTES
Pt presents to  with c/o left sided chest pain that started \"just a little bit ago. \" Pt reports he was seen at the ER on Friday for bilateral ankle swelling. Pt reports he was given magnesium at the time due to hypomagnesemia. Pt is hypertensive at this time which he denies Hx of. Pt denies having a cardiologist or a Hx of cardiac cath. Pt reports Hx of sickle cell and asthma. Pt reports SOB with exertion. Pt reports legs and ankles still feel tight.

## 2022-05-04 LAB
ANION GAP SERPL CALCULATED.3IONS-SCNC: 13 MEQ/L (ref 8–16)
BASOPHILS # BLD: 0.5 %
BASOPHILS ABSOLUTE: 0 THOU/MM3 (ref 0–0.1)
BUN BLDV-MCNC: 6 MG/DL (ref 7–22)
CALCIUM SERPL-MCNC: 8.9 MG/DL (ref 8.5–10.5)
CHLORIDE BLD-SCNC: 100 MEQ/L (ref 98–111)
CHOLESTEROL, TOTAL: 229 MG/DL (ref 100–199)
CO2: 24 MEQ/L (ref 23–33)
CREAT SERPL-MCNC: 0.6 MG/DL (ref 0.4–1.2)
EKG ATRIAL RATE: 82 BPM
EKG P AXIS: 58 DEGREES
EKG P-R INTERVAL: 132 MS
EKG Q-T INTERVAL: 390 MS
EKG QRS DURATION: 84 MS
EKG QTC CALCULATION (BAZETT): 455 MS
EKG R AXIS: 77 DEGREES
EKG T AXIS: 67 DEGREES
EKG VENTRICULAR RATE: 82 BPM
EOSINOPHIL # BLD: 0.4 %
EOSINOPHILS ABSOLUTE: 0 THOU/MM3 (ref 0–0.4)
ERYTHROCYTE [DISTWIDTH] IN BLOOD BY AUTOMATED COUNT: 14.8 % (ref 11.5–14.5)
ERYTHROCYTE [DISTWIDTH] IN BLOOD BY AUTOMATED COUNT: 57.3 FL (ref 35–45)
GFR SERPL CREATININE-BSD FRML MDRD: > 90 ML/MIN/1.73M2
GLUCOSE BLD-MCNC: 99 MG/DL (ref 70–108)
HCT VFR BLD CALC: 33.2 % (ref 42–52)
HDLC SERPL-MCNC: 120 MG/DL
HEMOGLOBIN: 11.8 GM/DL (ref 14–18)
IMMATURE GRANS (ABS): 0.02 THOU/MM3 (ref 0–0.07)
IMMATURE GRANULOCYTES: 0.3 %
LDL CHOLESTEROL CALCULATED: 98 MG/DL
LIPASE: 82.5 U/L (ref 5.6–51.3)
LYMPHOCYTES # BLD: 20.2 %
LYMPHOCYTES ABSOLUTE: 1.6 THOU/MM3 (ref 1–4.8)
MAGNESIUM: 1.8 MG/DL (ref 1.6–2.4)
MCH RBC QN AUTO: 37.1 PG (ref 26–33)
MCHC RBC AUTO-ENTMCNC: 35.5 GM/DL (ref 32.2–35.5)
MCV RBC AUTO: 104.4 FL (ref 80–94)
MONOCYTES # BLD: 10.2 %
MONOCYTES ABSOLUTE: 0.8 THOU/MM3 (ref 0.4–1.3)
NUCLEATED RED BLOOD CELLS: 0 /100 WBC
OSMOLALITY CALCULATION: 271.5 MOSMOL/KG (ref 275–300)
PLATELET # BLD: 311 THOU/MM3 (ref 130–400)
PMV BLD AUTO: 9.1 FL (ref 9.4–12.4)
POTASSIUM REFLEX MAGNESIUM: 3.5 MEQ/L (ref 3.5–5.2)
RBC # BLD: 3.18 MILL/MM3 (ref 4.7–6.1)
SEG NEUTROPHILS: 68.4 %
SEGMENTED NEUTROPHILS ABSOLUTE COUNT: 5.4 THOU/MM3 (ref 1.8–7.7)
SODIUM BLD-SCNC: 137 MEQ/L (ref 135–145)
TRIGL SERPL-MCNC: 54 MG/DL (ref 0–199)
TROPONIN T: < 0.01 NG/ML
TROPONIN T: < 0.01 NG/ML
WBC # BLD: 7.9 THOU/MM3 (ref 4.8–10.8)

## 2022-05-04 PROCEDURE — 6370000000 HC RX 637 (ALT 250 FOR IP): Performed by: PHYSICIAN ASSISTANT

## 2022-05-04 PROCEDURE — 84484 ASSAY OF TROPONIN QUANT: CPT

## 2022-05-04 PROCEDURE — 93005 ELECTROCARDIOGRAM TRACING: CPT | Performed by: PHYSICIAN ASSISTANT

## 2022-05-04 PROCEDURE — 99232 SBSQ HOSP IP/OBS MODERATE 35: CPT | Performed by: PHYSICIAN ASSISTANT

## 2022-05-04 PROCEDURE — 36415 COLL VENOUS BLD VENIPUNCTURE: CPT

## 2022-05-04 PROCEDURE — 6360000002 HC RX W HCPCS: Performed by: PHYSICIAN ASSISTANT

## 2022-05-04 PROCEDURE — 2580000003 HC RX 258: Performed by: PHYSICIAN ASSISTANT

## 2022-05-04 PROCEDURE — 1200000003 HC TELEMETRY R&B

## 2022-05-04 PROCEDURE — 80061 LIPID PANEL: CPT

## 2022-05-04 PROCEDURE — 83735 ASSAY OF MAGNESIUM: CPT

## 2022-05-04 PROCEDURE — 93010 ELECTROCARDIOGRAM REPORT: CPT | Performed by: INTERNAL MEDICINE

## 2022-05-04 PROCEDURE — 85025 COMPLETE CBC W/AUTO DIFF WBC: CPT

## 2022-05-04 PROCEDURE — 83690 ASSAY OF LIPASE: CPT

## 2022-05-04 PROCEDURE — 80048 BASIC METABOLIC PNL TOTAL CA: CPT

## 2022-05-04 RX ORDER — PHENOBARBITAL SODIUM 65 MG/ML
130 INJECTION INTRAMUSCULAR
Status: DISCONTINUED | OUTPATIENT
Start: 2022-05-04 | End: 2022-05-05 | Stop reason: HOSPADM

## 2022-05-04 RX ORDER — AMLODIPINE BESYLATE 5 MG/1
5 TABLET ORAL DAILY
Status: DISCONTINUED | OUTPATIENT
Start: 2022-05-04 | End: 2022-05-05 | Stop reason: HOSPADM

## 2022-05-04 RX ORDER — PANTOPRAZOLE SODIUM 40 MG/1
40 TABLET, DELAYED RELEASE ORAL DAILY
Status: DISCONTINUED | OUTPATIENT
Start: 2022-05-04 | End: 2022-05-05 | Stop reason: HOSPADM

## 2022-05-04 RX ORDER — PANTOPRAZOLE SODIUM 40 MG/1
40 TABLET, DELAYED RELEASE ORAL
Status: DISCONTINUED | OUTPATIENT
Start: 2022-05-05 | End: 2022-05-04

## 2022-05-04 RX ORDER — PHENOBARBITAL SODIUM 65 MG/ML
260 INJECTION INTRAMUSCULAR
Status: DISCONTINUED | OUTPATIENT
Start: 2022-05-04 | End: 2022-05-05 | Stop reason: HOSPADM

## 2022-05-04 RX ADMIN — ASPIRIN 81 MG 81 MG: 81 TABLET ORAL at 09:15

## 2022-05-04 RX ADMIN — PHENOBARBITAL SODIUM 130 MG: 65 INJECTION INTRAMUSCULAR; INTRAVENOUS at 09:16

## 2022-05-04 RX ADMIN — AMLODIPINE BESYLATE 5 MG: 5 TABLET ORAL at 11:54

## 2022-05-04 RX ADMIN — PANTOPRAZOLE SODIUM 40 MG: 40 TABLET, DELAYED RELEASE ORAL at 09:24

## 2022-05-04 RX ADMIN — ENOXAPARIN SODIUM 40 MG: 100 INJECTION SUBCUTANEOUS at 09:15

## 2022-05-04 RX ADMIN — SODIUM CHLORIDE, PRESERVATIVE FREE 10 ML: 5 INJECTION INTRAVENOUS at 21:57

## 2022-05-04 RX ADMIN — SODIUM CHLORIDE, PRESERVATIVE FREE 10 ML: 5 INJECTION INTRAVENOUS at 09:16

## 2022-05-04 ASSESSMENT — LIFESTYLE VARIABLES
HOW MANY STANDARD DRINKS CONTAINING ALCOHOL DO YOU HAVE ON A TYPICAL DAY: 10 OR MORE
HOW OFTEN DO YOU HAVE A DRINK CONTAINING ALCOHOL: 4 OR MORE TIMES A WEEK

## 2022-05-04 ASSESSMENT — PAIN SCALES - GENERAL
PAINLEVEL_OUTOF10: 0
PAINLEVEL_OUTOF10: 0

## 2022-05-04 NOTE — ED NOTES
Pt sitting up on cot, vomiting. States had eating some pineapple and became nauseated.       Sara Crum RN  05/03/22 3038

## 2022-05-04 NOTE — ED NOTES
[facility] screening completed. Pt medicated w/1mg of ativan and 4mg of zofran for increased nausea.       Paul Champion RN  05/03/22 8592

## 2022-05-04 NOTE — CONSULTS
Brief Intervention and Referral to Treatment Summary    Patient was provided PHQ-9, AUDIT-C and DAST Screening:      PHQ-9 Score: 0  AUDIT-C Score:  12  DAST Score:  1    Patients substance use is considered     Dependent    Patients depression is considered:     n/a    Brief Education Was Provided    Patient was receptive    Brief Intervention Is Provided (Only for AUDIT-C or DAST)     Patient reports readiness to decrease and/or stop use and a plan was discussed     Recommendations/Referrals for Brief and/or Specialized Treatment Provided to Patient     SBIRT completed. Pt fiance and sister present for screening per permission from pt. Pt was a former client of [facility] and is aware of what services they provide. Informed pt of [facility]. Resource packet provided to pt.

## 2022-05-04 NOTE — CARE COORDINATION
05/04/22 1045   Service Assessment   Patient Orientation Alert and Oriented   Cognition Alert   History Provided By Patient   Primary Caregiver Self   Accompanied By/Relationship SO. Support Systems Spouse/Significant Other   Patient's Healthcare Decision Maker is:   (Mother.)   PCP Verified by CM Yes  (Needs PCP. Prefers  [address]   Prior Functional Level Independent in ADLs/IADLs   Current Functional Level Independent in ADLs/IADLs   Can patient return to prior living arrangement Yes   Financial Resources Other (Comment)  (Public Benefits has seen for Medicaid application.)   Social/Functional History   Lives With Significant other   Active  Yes   Condition of Participation: Discharge Planning   The Plan for Transition of Care is related to the following treatment goals: Complete ETOH withdrawl. On Pheno.

## 2022-05-04 NOTE — FLOWSHEET NOTE
Utilize [facility] alcohol withdrawal scale (Based on [place] Modified Alcohol Withdrawal Scale). Tabulate score based on classifications including Tremor, Sweating, Hallucination, Orientation, and Agitation. Tremor: o  Sweatin  Hallucinations: 0  Orientation: 0  Agitation: 1  Total Score: 2  Action perform as described below     Tremor:  No tremor is 0 points. Tremor on movement is 1 point. Tremor at rest is 2 points. Sweating: No Sweat 0 points. Moist is 1 point. Drenching sweats is 2 points. Hallucinations: No present 0 points. Dissuadable is 1 point. Not dissuadable is 2 points. Orientation: Oriented 0 points. Vague/detached 1 point. Disoriented/no contact 2 points. Agitation: Calm 0 points. Anxious 1 point. Panicky 2 points. Check scale every 2 hours. Discontinue scoring with 4 consecutive scorings of 0. Scale 0: No phenobarbital given. Re-assess every 60 minutes as needed. Scale 1-3: Phenobarbital 130 mg IV over 3 minutes. Re-assess every 60 minutes as needed. May administer every 60 minutes to a maximum dose of phenobarbital 1040 mg in 24 hours! Scale 4-8: Phenobarbital 260 mg IV over 5 minutes. Re-assess every 60 minutes as needed. May administer every 60 minutes to a maximum dose of phenobarbital 1040mg in 24 hours! Scale 9-10: Transfer to ICU (if not already in ICU). Administer 10mg/kg phenobarbital IV over 60 minutes. Maximum dose phenobarbital is 1040mg in 24 hours!

## 2022-05-04 NOTE — CARE COORDINATION
5/4/22, 4:02 PM EDT    DISCHARGE PLANNING EVALUATION      Received consult \"For consideration of rehab\". Addiction SW saw patient and gave Addiction resource packet. He is former client of [facility] and was informed of [address] [place] [address]. Full assessment deferred. SW will continue to follow for further needs.

## 2022-05-04 NOTE — PROGRESS NOTES
Hospitalist Progress Note    Patient:  Osito Mac      Unit/Bed:8B-21/021-A    YOB: 1990    MRN: [id number]       Acct: [de-identified]     PCP: Tito Coates DO    Date of Admission: 5/3/2022    Assessment/Plan:    1. Hypomagnesemia-secondary to alcohol abuse  1. Improved from 1.0-1.8 today with magnesium replacement protocol. 2. Continue to monitor daily  2. Alcohol abuse with current withdrawal  1. Phenobarbital protocol in place  2. Seizure precautions  3. Social work and addiction services consulted for discussion of inpatient versus outpatient treatment options  3. SVT-paroxysmal-secondary to hypomagnesemia  1. Resolved with vagal maneuver in the ED  2. Repeat EKG this morning  3. Continue telemetry  4. Atypical chest pain  1. ACS was ruled out. Serial troponins negative  2. Lipase ordered  3. Started on PPI due to possible gastritis causing symptoms  4. Statin was initiated on admission-draw lipid panel to assess appropriateness  5. Hypertension  1. Reported to be 188/108 at urgent care on 4/3/2022  2. Currently 157/103-suspect partially due to alcohol withdrawal.  3.  initiate low-dose amlodipine    Expected discharge date:  5/5/2022    Disposition:    [x] Home        [] TCU       [] Rehab       [] Psych       [] SNF       [] [place]       [] Other-    Chief Complaint: Chest pain    Hospital Course: Patient is a 64-year-old male with a past medical history of alcohol abuse who presented to the ED on 5/3/2022 for chest pain on the left side worse with cough and deep breathing. Patient reports being a heavy daily drinker, drinking 2 pints of ru with 6-7 Pepsi's per day. He expresses desire to quit drinking. Initial work-up in the emergency room revealed hypomagnesemia of 1.0. Magnesium was replaced. During this process patient's heart rate increased to the low 200s and was found to be in SVT. SVT was aborted with vagal maneuver.     Serial troponins x3 negative. BNP negative. AST and ALT slightly elevated. Chest x-ray negative. Flu and COVID-negative    Repeat magnesium levels today improved to 1.8. Phenobarbital protocol was initiated today and Ativan protocol was stopped. Repeat EKG this morning pending. Blood pressures continue to be elevated in the 086Y systolic and low 573R diastolic. We will initiate low-dose amlodipine. ACE/ARB avoided due to patient being -American and diuretics avoided given patient's high risk of having hyponatremia with alcohol abuse. PPI initiated to see if this helps with atypical chest pain which may possibly be an underlying gastritis          Subjective (past 24 hours): Patient reports that he is feeling agitated and sweaty today. He states he does not wish to be here and would like to go home. He states he is no longer having any chest pain and has not had chest pain since the emergency room. Denies any abdominal pain. States that his feet feel tingly. Denies any calf pain. Also denies any palpitations or shortness of breath. Medications:  Reviewed    Infusion Medications    sodium chloride      sodium chloride 125 mL/hr at 05/03/22 7472     Scheduled Medications    sodium chloride flush  5-40 mL IntraVENous 2 times per day    enoxaparin  40 mg SubCUTAneous Daily    aspirin  81 mg Oral Daily    atorvastatin  40 mg Oral Nightly     PRN Meds: PHENobarbital **OR** PHENobarbital **OR** PHENobarbital IVPB, sodium chloride flush, sodium chloride, ondansetron **OR** ondansetron, polyethylene glycol, acetaminophen **OR** acetaminophen, potassium chloride **OR** potassium alternative oral replacement **OR** potassium chloride, magnesium sulfate    No intake or output data in the 24 hours ending 05/04/22 0907    Diet:  ADULT DIET;  Regular    Exam:  BP (!) 150/97   Pulse 92   Temp 98.3 °F (36.8 °C) (Oral)   Resp 15   Ht 6' (1.829 m)   Wt 162 lb (73.5 kg)   SpO2 99%   BMI 21.97 kg/m² General appearance: No apparent distress, appears stated age and cooperative. Patient is mildly diaphoretic. HEENT: Pupils equal, round, and reactive to light. Conjunctivae/corneas clear. Neck: Supple, with full range of motion. No jugular venous distention. Trachea midline. Respiratory:  Normal respiratory effort. Clear to auscultation, bilaterally without Rales/Wheezes/Rhonchi. Cardiovascular: Regular rate and rhythm with normal S1/S2 without murmurs, rubs or gallops. No edema. Abdomen: Soft, non-tender, non-distended with normal bowel sounds. Musculoskeletal: passive and active ROM x 4 extremities. Skin: Skin color, texture, turgor normal.  No rashes or lesions. Neurologic:  Neurovascularly intact without any focal sensory/motor deficits. Cranial nerves: II-XII intact, grossly non-focal.  Intention tremor noted  Psychiatric: Alert and oriented, agitated  Capillary Refill: Brisk,< 3 seconds   Peripheral Pulses: +2 palpable, equal bilaterally       Labs:   Recent Labs     05/03/22  1510 05/04/22  0423   WBC 8.5 7.9   HGB 11.5* 11.8*   HCT 33.0* 33.2*    311     Recent Labs     05/03/22  1510 05/04/22  0423    137   K 3.6 3.5    100   CO2 25 24   BUN 7 6*   CREATININE 0.7 0.6   CALCIUM 9.2 8.9     Recent Labs     05/03/22  1510   AST 88*   ALT 93*   BILITOT 0.5   ALKPHOS 119     No results for input(s): INR in the last 72 hours. No results for input(s): Miah Snowball in the last 72 hours. Microbiology:      Urinalysis:    No results found for: Diaz Kendall, BACTERIA, RBCUA, BLOODU, SPECGRAV, GLUCOSEU    Radiology:  XR CHEST (2 VW)    Result Date: 5/3/2022  PROCEDURE: XR CHEST (2 VW) CLINICAL INFORMATION: Chest pain. History of sickle cell. COMPARISON: 4/29/2022 TECHNIQUE: PA and lateral views of the chest were obtained. Normal chest. No acute findings. **This report has been created using voice recognition software.   It may contain minor errors which are inherent in voice recognition technology. ** Final report electronically signed by Dr. Yesenia Benjamin on 5/3/2022 4:24 PM      DVT prophylaxis: [x] Lovenox                                 [] SCDs                                 [] SQ Heparin                                 [] Encourage ambulation           [] Already on Anticoagulation     Code Status: Full Code    PT/OT Eval Status: Monitor    Tele:   [x] yes             [] no    Active Hospital Problems    Diagnosis Date Noted    Alcohol abuse [F10.10] 05/03/2022     Priority: Medium       Electronically signed by Yahaira Cannon PA-C on 5/4/2022 at 9:13 AM

## 2022-05-04 NOTE — PLAN OF CARE
Problem: Discharge Planning  Goal: Discharge to home or other facility with appropriate resources  5/4/2022 1427 by Christopher Ge RN  Outcome: Progressing  Flowsheets (Taken 5/4/2022 1427)  Discharge to home or other facility with appropriate resources: Identify barriers to discharge with patient and caregiver  Note: Discharge to home with fiance. Pheno started  5/4/2022 1426 by Christopher Ge RN  Outcome: Progressing     Problem: Pain  Goal: Verbalizes/displays adequate comfort level or baseline comfort level  5/4/2022 1427 by Christopher Ge RN  Outcome: Progressing  Flowsheets (Taken 5/4/2022 1427)  Verbalizes/displays adequate comfort level or baseline comfort level:   Encourage patient to monitor pain and request assistance   Assess pain using appropriate pain scale  Note: Denies pain. 5/4/2022 1426 by Christopher Ge RN  Outcome: Progressing     Problem: ABCDS Injury Assessment  Goal: Absence of physical injury  5/4/2022 1427 by Christopher Ge RN  Outcome: Progressing  Note: Free from injury. Indep. Call light is with in reach. 5/4/2022 1426 by Christopher Ge RN  Outcome: Progressing     Care plan reviewed with patient. Patient verbalize understanding of the plan of care and contribute to goal setting.

## 2022-05-04 NOTE — CARE COORDINATION
5/4/22, 7:26 AM EDT  DISCHARGE PLANNING EVALUATION:    Kamryn Precise       Admitted: [address] day: 1   Location: 8B-21/021-A Reason for admit: Alcohol abuse [F10.10]  Chest pain, unspecified type [R07.9]  Hypertension, unspecified type [I10]   PMH:  has a past medical history of Alcohol abuse. Procedure: No.  Barriers to Discharge: To ER with CP and cough. He reports that he smokes 1 pack/day of cigarettes, drinks 2 pints of ru with 6-7 Pepsi's per day, and smokes marijuana most recently yesterday. He reports that his last drink was last night. Noted to have SVT. Mg+ replaced. CIWA. Addictions services and SW consulted. Now on Pheno. PCP: Nicole Torrez DO  Readmission Risk Score: 8 ( )%    Patient Goals/Plan/Treatment Preferences: Met with [facility] this am and SO is present. Pt is from home with SO. No service or DME. He states he was supposed to start a new job yesterday. He has transportation and is able to get meds. He needs a PCP that takes uninsured pts presently and he prefers [facility] on [address]   Transportation/Food Security/Housekeeping Addressed:  No issues identified.

## 2022-05-05 VITALS
WEIGHT: 162 LBS | HEIGHT: 72 IN | TEMPERATURE: 98.9 F | HEART RATE: 79 BPM | BODY MASS INDEX: 21.94 KG/M2 | RESPIRATION RATE: 16 BRPM | DIASTOLIC BLOOD PRESSURE: 90 MMHG | OXYGEN SATURATION: 100 % | SYSTOLIC BLOOD PRESSURE: 160 MMHG

## 2022-05-05 LAB
ANION GAP SERPL CALCULATED.3IONS-SCNC: 9 MEQ/L (ref 8–16)
BUN BLDV-MCNC: 5 MG/DL (ref 7–22)
CALCIUM SERPL-MCNC: 8.8 MG/DL (ref 8.5–10.5)
CHLORIDE BLD-SCNC: 100 MEQ/L (ref 98–111)
CO2: 28 MEQ/L (ref 23–33)
CREAT SERPL-MCNC: 0.6 MG/DL (ref 0.4–1.2)
GFR SERPL CREATININE-BSD FRML MDRD: > 90 ML/MIN/1.73M2
GLUCOSE BLD-MCNC: 90 MG/DL (ref 70–108)
MAGNESIUM: 1.5 MG/DL (ref 1.6–2.4)
POTASSIUM SERPL-SCNC: 4 MEQ/L (ref 3.5–5.2)
SODIUM BLD-SCNC: 137 MEQ/L (ref 135–145)

## 2022-05-05 PROCEDURE — 6370000000 HC RX 637 (ALT 250 FOR IP): Performed by: PHYSICIAN ASSISTANT

## 2022-05-05 PROCEDURE — 99239 HOSP IP/OBS DSCHRG MGMT >30: CPT | Performed by: PHYSICIAN ASSISTANT

## 2022-05-05 PROCEDURE — 36415 COLL VENOUS BLD VENIPUNCTURE: CPT

## 2022-05-05 PROCEDURE — 83735 ASSAY OF MAGNESIUM: CPT

## 2022-05-05 PROCEDURE — 80048 BASIC METABOLIC PNL TOTAL CA: CPT

## 2022-05-05 PROCEDURE — 2580000003 HC RX 258: Performed by: PHYSICIAN ASSISTANT

## 2022-05-05 RX ORDER — LANOLIN ALCOHOL/MO/W.PET/CERES
800 CREAM (GRAM) TOPICAL DAILY
Status: DISCONTINUED | OUTPATIENT
Start: 2022-05-05 | End: 2022-05-05 | Stop reason: HOSPADM

## 2022-05-05 RX ORDER — AMLODIPINE BESYLATE 5 MG/1
5 TABLET ORAL DAILY
Qty: 30 TABLET | Refills: 1 | Status: SHIPPED | OUTPATIENT
Start: 2022-05-06

## 2022-05-05 RX ORDER — PANTOPRAZOLE SODIUM 40 MG/1
40 TABLET, DELAYED RELEASE ORAL DAILY
Qty: 30 TABLET | Refills: 1 | Status: SHIPPED | OUTPATIENT
Start: 2022-05-06

## 2022-05-05 RX ORDER — LANOLIN ALCOHOL/MO/W.PET/CERES
800 CREAM (GRAM) TOPICAL DAILY
Qty: 60 TABLET | Refills: 1 | Status: SHIPPED | OUTPATIENT
Start: 2022-05-06

## 2022-05-05 RX ADMIN — ASPIRIN 81 MG 81 MG: 81 TABLET ORAL at 08:19

## 2022-05-05 RX ADMIN — PANTOPRAZOLE SODIUM 40 MG: 40 TABLET, DELAYED RELEASE ORAL at 08:19

## 2022-05-05 RX ADMIN — AMLODIPINE BESYLATE 5 MG: 5 TABLET ORAL at 08:19

## 2022-05-05 RX ADMIN — Medication 800 MG: at 08:19

## 2022-05-05 RX ADMIN — SODIUM CHLORIDE, PRESERVATIVE FREE 10 ML: 5 INJECTION INTRAVENOUS at 08:20

## 2022-05-05 ASSESSMENT — PAIN SCALES - GENERAL
PAINLEVEL_OUTOF10: 0
PAINLEVEL_OUTOF10: 0

## 2022-05-05 NOTE — CARE COORDINATION
5/5/22, 11:01 AM EDT    Patient goals/plan/ treatment preferences discussed by  and . Patient goals/plan/ treatment preferences reviewed with patient/ family. Patient/ family verbalize understanding of discharge plan and are in agreement with goal/plan/treatment preferences. Understanding was demonstrated using the teach back method. AVS provided by RN at time of discharge, which includes all necessary medical information pertaining to the patients current course of illness, treatment, post-discharge goals of care, and treatment preferences. Services At/After Discharge: None. Addictions resources were provided and PCP appt arranged. Pt was also screened and Medicaid application in process.      Discharged home with SO.

## 2022-05-05 NOTE — PROGRESS NOTES
Utilize [facility] alcohol withdrawal scale (Based on [place] Modified Alcohol Withdrawal Scale). Tabulate score based on classifications including Tremor, Sweating, Hallucination, Orientation, and Agitation. Tremor:0  Sweatin  Hallucinations: 0  Orientation: 0  Agitation: 0  Total Score: 0  Action perform as described below     Tremor:  No tremor is 0 points. Tremor on movement is 1 point. Tremor at rest is 2 points. Sweating: No Sweat 0 points. Moist is 1 point. Drenching sweats is 2 points. Hallucinations: No present 0 points. Dissuadable is 1 point. Not dissuadable is 2 points. Orientation: Oriented 0 points. Vague/detached 1 point. Disoriented/no contact 2 points. Agitation: Calm 0 points. Anxious 1 point. Panicky 2 points. Check scale every 2 hours. Discontinue scoring with 4 consecutive scorings of 0. Scale 0: No phenobarbital given. Re-assess every 60 minutes as needed. Scale 1-3: Phenobarbital 130 mg IV over 3 minutes. Re-assess every 60 minutes as needed. May administer every 60 minutes to a maximum dose of phenobarbital 1040 mg in 24 hours! Scale 4-8: Phenobarbital 260 mg IV over 5 minutes. Re-assess every 60 minutes as needed. May administer every 60 minutes to a maximum dose of phenobarbital 1040mg in 24 hours! Scale 9-10: Transfer to ICU (if not already in ICU). Administer 10mg/kg phenobarbital IV over 60 minutes. Maximum dose phenobarbital is 1040mg in 24 hours!

## 2022-05-05 NOTE — PLAN OF CARE
Problem: Pain  Goal: Verbalizes/displays adequate comfort level or baseline comfort level  5/5/2022 0923 by Ness Loving RN  Outcome: Progressing  Flowsheets (Taken 5/4/2022 2145 by Erika Hawkins RN)  Verbalizes/displays adequate comfort level or baseline comfort level: Encourage patient to monitor pain and request assistance  Note: Denies any pain. Problem: ABCDS Injury Assessment  Goal: Absence of physical injury  5/5/2022 4002 by Ness Loving RN  Outcome: Progressing  Note: Free from injury. Indep in room. Ambulating jacinto per self. Call light is with in reach. Care plan reviewed with patient. Patient  verbalize understanding of the plan of care and contribute to goal setting.

## 2022-05-05 NOTE — PLAN OF CARE
Problem: Pain  Goal: Verbalizes/displays adequate comfort level or baseline comfort level  5/4/2022 2253 by Rani Leonard RN  Outcome: Progressing  Flowsheets (Taken 5/4/2022 2145)  Verbalizes/displays adequate comfort level or baseline comfort level: Encourage patient to monitor pain and request assistance  Note: Patient will have decrease pain. Patient will rate pain on a 0-10 scale. Non-pharmaceutical pain interventions will be used before medications. Patient denies pain at this time, will continue to monitor. Problem: ABCDS Injury Assessment  Goal: Absence of physical injury  5/4/2022 2253 by Rani Leonard RN  Outcome: Progressing  Note: Patient free from accidental injury. Bed in low locked position, side rails up x2. Call light and personal belongings within reach. Care plan reviewed with patient, no questions or concerns at this time.

## 2022-05-05 NOTE — FLOWSHEET NOTE
Utilize [facility] alcohol withdrawal scale (Based on [place] Modified Alcohol Withdrawal Scale). Tabulate score based on classifications including Tremor, Sweating, Hallucination, Orientation, and Agitation. Tremor: 0  Sweatin  Hallucinations: 0  Orientation: 0  Agitation: 0  Total Score: 0  Action perform as described below     Tremor:  No tremor is 0 points. Tremor on movement is 1 point. Tremor at rest is 2 points. Sweating: No Sweat 0 points. Moist is 1 point. Drenching sweats is 2 points. Hallucinations: No present 0 points. Dissuadable is 1 point. Not dissuadable is 2 points. Orientation: Oriented 0 points. Vague/detached 1 point. Disoriented/no contact 2 points. Agitation: Calm 0 points. Anxious 1 point. Panicky 2 points. Check scale every 2 hours. Discontinue scoring with 4 consecutive scorings of 0. Scale 0: No phenobarbital given. Re-assess every 60 minutes as needed. Scale 1-3: Phenobarbital 130 mg IV over 3 minutes. Re-assess every 60 minutes as needed. May administer every 60 minutes to a maximum dose of phenobarbital 1040 mg in 24 hours! Scale 4-8: Phenobarbital 260 mg IV over 5 minutes. Re-assess every 60 minutes as needed. May administer every 60 minutes to a maximum dose of phenobarbital 1040mg in 24 hours! Scale 9-10: Transfer to ICU (if not already in ICU). Administer 10mg/kg phenobarbital IV over 60 minutes. Maximum dose phenobarbital is 1040mg in 24 hours!

## 2022-05-05 NOTE — DISCHARGE SUMMARY
Hospital Medicine Discharge Summary      Patient Identification:   Kenyatta Form   : 1990  MRN: [id number]   Account: [de-identified]      Patient's PCP: La Nena Blevins DO    Admit Date: 5/3/2022     Discharge Date: 2022      Admitting Physician: Everardo Quan MD     Discharging Physician Assistant: Erica Ulrich PA-C     Discharge Diagnoses with Assessment/Plan:    1. Hypomagnesemia-secondary to alcohol abuse  1. Fluctuated from 1.0 to 1.8- currently 1.5 on discharge  2. Magnesium supplementation prescribed for patient to take outpatient followed by repeat magnesium levels in 3-5 days. 2. Alcohol abuse with current withdrawal  1. Phenobarbital protocol in place- only required one dose  2. Recommended for patient to follow up outpatient at [facility] or [facility]  3. Mildly elevated lipase level  3. SVT-paroxysmal-secondary to hypomagnesemia  1. Resolved with vagal maneuver in the ED  4. Atypical chest pain  1. ACS was ruled out. Serial troponins negative  2. Started on PPI due to possible gastritis causing symptoms- continue outpatient  5. Hypertension  1. Reported to be 188/108 at urgent care on 4/3/2022  2. Manual reading 140/88 today  3. Continue with amlodipine and follow up with PCP outpatient      The patient was seen and examined on day of discharge and this discharge summary is in conjunction with any daily progress note from day of discharge. Hospital Course:    Patient is a 75-year-old male with a past medical history of alcohol abuse who presented to the ED on 5/3/2022 for chest pain on the left side worse with cough and deep breathing. Patient reports being a heavy daily drinker, drinking 2 pints of ru with 6-7 Pepsi's per day. He expresses desire to quit drinking.      Initial work-up in the emergency room revealed hypomagnesemia of 1.0. Magnesium was replaced.   During this process patient's heart rate increased to the low 200s and was found to be in SVT.  SVT was aborted with vagal maneuver.     Serial troponins x3 negative. BNP negative. AST and ALT slightly elevated. Chest x-ray negative. Flu and COVID-negative     Repeat magnesium levels yesterday improved to 1.8 but today was 1.5. On discharge, recommend magnesium supplementation and close follow up outpatient. Phenobarbital protocol was initiated  and Ativan protocol was stopped. Blood pressures continued to be elevated in the 400P systolic and low 259Q diastolic. We will initiate low-dose amlodipine. ACE/ARB avoided due to patient being -American and diuretics avoided given patient's high risk of having hyponatremia with alcohol abuse.      PPI initiated to see if this helps with atypical chest pain which may possibly be an underlying gastritis         Blood pressure improved today to 140/88 with manual cuff. Continue with amlodipine      Exam:     Vitals:  Vitals:    05/04/22 1510 05/04/22 2145 05/05/22 0319 05/05/22 0800   BP: (!) 152/109 (!) 153/95 (!) 151/109 (!) 160/90   Pulse: 81 85 82 79   Resp: 16 18 8 16   Temp: 98.3 °F (36.8 °C) 98.9 °F (37.2 °C) 98.2 °F (36.8 °C) 98.9 °F (37.2 °C)   TempSrc: Oral Oral Oral Oral   SpO2: 100% 99% 100% 100%   Weight:       Height:         Weight: Weight: 162 lb (73.5 kg)     24 hour intake/output:    Intake/Output Summary (Last 24 hours) at 5/5/2022 1258  Last data filed at 5/5/2022 0319  Gross per 24 hour   Intake 1160 ml   Output --   Net 1160 ml         General appearance: No apparent distress, appears stated age and cooperative. Patient is mildly diaphoretic. HEENT: Pupils equal, round, and reactive to light. Conjunctivae/corneas clear. Neck: Supple, with full range of motion. No jugular venous distention. Trachea midline. Respiratory:  Normal respiratory effort. Clear to auscultation, bilaterally without Rales/Wheezes/Rhonchi. Cardiovascular: Regular rate and rhythm with normal S1/S2 without murmurs, rubs or gallops. No edema. Abdomen: Soft, non-tender, non-distended with normal bowel sounds. Musculoskeletal: passive and active ROM x 4 extremities. Skin: Skin color, texture, turgor normal.  No rashes or lesions. Neurologic:  Neurovascularly intact without any focal sensory/motor deficits. Cranial nerves: II-XII intact, grossly non-focal.  Intention tremor noted  Psychiatric: Alert and oriented, pleasant today  Capillary Refill: Brisk,< 3 seconds   Peripheral Pulses: +2 palpable, equal bilaterally       Labs: For convenience and continuity at follow-up the following most recent labs are provided:      CBC:    Lab Results   Component Value Date    WBC 7.9 05/04/2022    HGB 11.8 05/04/2022    HCT 33.2 05/04/2022     05/04/2022       Renal:    Lab Results   Component Value Date     05/05/2022    K 4.0 05/05/2022    K 3.5 05/04/2022     05/05/2022    CO2 28 05/05/2022    BUN 5 05/05/2022    CREATININE 0.6 05/05/2022    CALCIUM 8.8 05/05/2022       Cardiac:   Recent Labs     05/03/22  1510 05/03/22  2304 05/04/22  0423   TROPONINT < 0.010 < 0.010 < 0.010       Significant Diagnostic Studies    Radiology:   XR CHEST (2 VW)   Final Result   Normal chest. No acute findings. **This report has been created using voice recognition software. It may contain minor errors which are inherent in voice recognition technology. **      Final report electronically signed by Dr. Ken Carter on 5/3/2022 4:24 PM             Consults:     [facility] CONSULT TO ADDICTION SERVICES    Disposition:    [x] Home       [] TCU       [] Rehab       [] Psych       [] SNF       [] [place]       [] Other-    Condition at Discharge: Stable    Code Status:  Full code    Pending tests at discharge:          Patient Instructions:    Discharge lab work: Magnesium in 3-5 days  Activity: activity as tolerated  Diet: No diet orders on file      Follow-up visits:   [address] [place] [address] [address]  [address] [address] [phone]      Office will contact patient for a 1 week follow up appointment. Discharge Medications:        Medication List      START taking these medications    amLODIPine 5 MG tablet  Commonly known as: NORVASC  Take 1 tablet by mouth daily  Start taking on: May 6, 2022     magnesium oxide 400 (240 Mg) MG tablet  Commonly known as: MAG-OX  Take 2 tablets by mouth daily  Start taking on: May 6, 2022     pantoprazole 40 MG tablet  Commonly known as: PROTONIX  Take 1 tablet by mouth daily  Start taking on: May 6, 2022        CONTINUE taking these medications    therapeutic multivitamin-minerals tablet  Take 1 tablet by mouth daily           Where to Get Your Medications      These medications were sent to [address] [place] [address] , [address] [facility]  [address], [address] [address]    Phone: [phone]   · amLODIPine 5 MG tablet  · magnesium oxide 400 (240 Mg) MG tablet  · pantoprazole 40 MG tablet         Time Spent on discharge is more than 45 minutes in the examination, evaluation, counseling and review of medications and discharge plan. Signed: Thank you Carola Miller DO for the opportunity to be involved in this patient's care.     Electronically signed by Zeyad Sotelo PA-C on 5/5/2022 at 12:58 PM

## 2022-05-05 NOTE — FLOWSHEET NOTE
Discharge teaching and instructions for diagnosis/procedure of Alcohol withdraw completed with patient using teachback method. AVS reviewed. Printed prescriptions given to patient. Patient voiced understanding regarding prescriptions, follow up appointments, and care of self at home. Discharged ambulatory to  independent living per family.

## 2023-07-06 ENCOUNTER — HOSPITAL ENCOUNTER (EMERGENCY)
Age: 33
Discharge: HOME | End: 2023-07-06
Attending: EMERGENCY MEDICINE
Payer: COMMERCIAL

## 2023-07-06 VITALS
SYSTOLIC BLOOD PRESSURE: 121 MMHG | HEART RATE: 115 BPM | RESPIRATION RATE: 18 BRPM | DIASTOLIC BLOOD PRESSURE: 80 MMHG | TEMPERATURE: 98 F | OXYGEN SATURATION: 98 %

## 2023-07-06 DIAGNOSIS — S80.862A NONVENOMOUS INSECT BITE OF LEFT LOWER EXTREMITY, INITIAL ENCOUNTER: Primary | ICD-10-CM
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
## 2023-07-06 PROCEDURE — 99283 EMERGENCY DEPT VISIT LOW MDM: CPT

## 2023-07-06 RX ORDER — DOXYCYCLINE HYCLATE 100 MG/1
100 CAPSULE ORAL 2 TIMES DAILY
Qty: 14 CAPSULE | Refills: 0 | Status: SHIPPED | OUTPATIENT
Start: 2023-07-06 | End: 2023-07-13
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
## 2023-07-07 ENCOUNTER — TELEPHONE (OUTPATIENT)
Dept: FAMILY MEDICINE CLINIC | Age: 33
End: 2023-07-07

## 2023-07-10 ENCOUNTER — OFFICE VISIT (OUTPATIENT)
Dept: FAMILY MEDICINE CLINIC | Age: 33
End: 2023-07-10
Payer: COMMERCIAL

## 2023-07-10 VITALS
HEIGHT: 72 IN | BODY MASS INDEX: 22.29 KG/M2 | TEMPERATURE: 98.3 F | DIASTOLIC BLOOD PRESSURE: 82 MMHG | SYSTOLIC BLOOD PRESSURE: 126 MMHG | HEART RATE: 80 BPM | RESPIRATION RATE: 18 BRPM | OXYGEN SATURATION: 98 % | WEIGHT: 164.6 LBS
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
## 2023-07-10 PROCEDURE — 4004F PT TOBACCO SCREEN RCVD TLK: CPT

## 2023-07-10 PROCEDURE — G8420 CALC BMI NORM PARAMETERS: HCPCS

## 2023-07-10 PROCEDURE — 99214 OFFICE O/P EST MOD 30 MIN: CPT

## 2023-07-10 PROCEDURE — G8427 DOCREV CUR MEDS BY ELIG CLIN: HCPCS

## 2023-07-10 SDOH — ECONOMIC STABILITY: HOUSING INSECURITY
IN THE LAST 12 MONTHS, WAS THERE A TIME WHEN YOU DID NOT HAVE A STEADY PLACE TO SLEEP OR SLEPT IN A SHELTER (INCLUDING NOW)?: NO

## 2023-07-10 SDOH — ECONOMIC STABILITY: INCOME INSECURITY: HOW HARD IS IT FOR YOU TO PAY FOR THE VERY BASICS LIKE FOOD, HOUSING, MEDICAL CARE, AND HEATING?: NOT HARD AT ALL

## 2023-07-10 SDOH — ECONOMIC STABILITY: FOOD INSECURITY: WITHIN THE PAST 12 MONTHS, YOU WORRIED THAT YOUR FOOD WOULD RUN OUT BEFORE YOU GOT MONEY TO BUY MORE.: NEVER TRUE

## 2023-07-10 SDOH — ECONOMIC STABILITY: FOOD INSECURITY: WITHIN THE PAST 12 MONTHS, THE FOOD YOU BOUGHT JUST DIDN'T LAST AND YOU DIDN'T HAVE MONEY TO GET MORE.: NEVER TRUE

## 2023-07-10 ASSESSMENT — PATIENT HEALTH QUESTIONNAIRE - PHQ9
SUM OF ALL RESPONSES TO PHQ QUESTIONS 1-9: 0
SUM OF ALL RESPONSES TO PHQ9 QUESTIONS 1 & 2: 0
SUM OF ALL RESPONSES TO PHQ QUESTIONS 1-9: 0
1. LITTLE INTEREST OR PLEASURE IN DOING THINGS: 0
SUM OF ALL RESPONSES TO PHQ QUESTIONS 1-9: 0
2. FEELING DOWN, DEPRESSED OR HOPELESS: 0
SUM OF ALL RESPONSES TO PHQ QUESTIONS 1-9: 0

## 2023-07-10 ASSESSMENT — ENCOUNTER SYMPTOMS
COUGH: 0
ABDOMINAL PAIN: 0
DIARRHEA: 0
SHORTNESS OF BREATH: 0
NAUSEA: 0

## 2023-07-10 NOTE — PROGRESS NOTES
look out for that would require more urgent evaluation. Patient verbalized understanding. We will notify patient of ultrasound results. Health Maintenance Due   Topic Date Due    COVID-19 Vaccine (1) Never done    Varicella vaccine (1 of 2 - 2-dose childhood series) Never done    Pneumococcal 0-64 years Vaccine (1 - PCV) Never done    HIV screen  Never done    Hepatitis C screen  Never done    DTaP/Tdap/Td vaccine (1 - Tdap) Never done       Attending Physician Statement  I have discussed the case, including pertinent history and exam findings with the resident. I also have seen the patient and performed key portions of the examination. I agree with the documented assessment and plan as documented by the resident.   Breann Johnson., DO 7/11/2023 2:05 PM
assess for posisble abscess. - Finish out Doxy as prescribed by ED  - Voltaren gel for any inflammation that be present/superficial thrombophlebitis   - Compression stockings   - If he notices drainage from area, has fevers, chills, he is to contact out office or go to the ED.   - May need I/D vs change of abx pending clinical course. - US HEAD NECK SOFT TISSUE THYROID; Future  - diclofenac sodium (VOLTAREN) 1 % GEL; Apply 4 g topically 4 times daily  Dispense: 350 g; Refill: 1  - Elastic Bandages & Supports (MEDICAL COMPRESSION STOCKINGS) MISC; 1 each by Does not apply route daily  Dispense: 1 each; Refill: 0      Return in about 1 month (around 8/10/2023) for medical check up. Orders Placed  Orders Placed This Encounter   Procedures    US HEAD NECK SOFT TISSUE THYROID     This procedure can be scheduled via [employer] [facility]. Access your [employer] account by visiting [email or web address]. Standing Status:   Future     Standing Expiration Date:   7/10/2024     Order Specific Question:   Reason for exam:     Answer:   Patient needs u/s to left calf, has likely insect bite there, assess if has abscess to this soft tissue area. Prescriptions given/sent   Orders Placed This Encounter   Medications    diclofenac sodium (VOLTAREN) 1 % GEL     Sig: Apply 4 g topically 4 times daily     Dispense:  350 g     Refill:  1    Elastic Bandages & Supports (MEDICAL COMPRESSION STOCKINGS) MISC     Si each by Does not apply route daily     Dispense:  1 each     Refill:  0       Patient instructions given and reviewed. Discussed use, benefit, and side effects of recommended medications. All patient questions answered. Pt voiced understanding.               Electronically signed by Valente Hawkins DO on 7/10/2023at 5:37 PM

## 2023-10-31 ENCOUNTER — HOSPITAL ENCOUNTER (EMERGENCY)
Age: 33
Discharge: HOME | End: 2023-10-31
Attending: STUDENT IN AN ORGANIZED HEALTH CARE EDUCATION/TRAINING PROGRAM
Payer: COMMERCIAL

## 2023-10-31 ENCOUNTER — APPOINTMENT (OUTPATIENT)
Dept: GENERAL RADIOLOGY | Age: 33
End: 2023-10-31
Payer: COMMERCIAL

## 2023-10-31 VITALS
HEIGHT: 70 IN | HEART RATE: 102 BPM | WEIGHT: 165 LBS | DIASTOLIC BLOOD PRESSURE: 86 MMHG | TEMPERATURE: 98.2 F | OXYGEN SATURATION: 97 % | SYSTOLIC BLOOD PRESSURE: 136 MMHG | BODY MASS INDEX: 23.62 KG/M2 | RESPIRATION RATE: 17 BRPM

## 2023-10-31 DIAGNOSIS — E83.42 HYPOMAGNESEMIA: Primary | ICD-10-CM

## 2023-10-31 DIAGNOSIS — R07.89 ATYPICAL CHEST PAIN: ICD-10-CM

## 2023-10-31 DIAGNOSIS — E87.6 HYPOKALEMIA: ICD-10-CM

## 2023-10-31 LAB
ANION GAP SERPL CALC-SCNC: 20 MEQ/L (ref 8–16)
BASOPHILS ABSOLUTE: 0 THOU/MM3 (ref 0–0.1)
BASOPHILS NFR BLD AUTO: 0.3 %
BUN SERPL-MCNC: 9 MG/DL (ref 7–22)
CALCIUM SERPL-MCNC: 9 MG/DL (ref 8.5–10.5)
CHLORIDE SERPL-SCNC: 90 MEQ/L (ref 98–111)
CO2 SERPL-SCNC: 26 MEQ/L (ref 23–33)
CREAT SERPL-MCNC: 0.8 MG/DL (ref 0.4–1.2)
DEPRECATED RDW RBC AUTO: 43.4 FL (ref 35–45)
EOSINOPHIL NFR BLD AUTO: 2.2 %
EOSINOPHILS ABSOLUTE: 0.2 THOU/MM3 (ref 0–0.4)
ERYTHROCYTE [DISTWIDTH] IN BLOOD BY AUTOMATED COUNT: 12.4 % (ref 11.5–14.5)
FLUAV RNA RESP QL NAA+PROBE: NOT DETECTED
FLUBV RNA RESP QL NAA+PROBE: NOT DETECTED
GFR SERPL CREATININE-BSD FRML MDRD: > 60 ML/MIN/1.73M2
GLUCOSE SERPL-MCNC: 114 MG/DL (ref 70–108)
HCT VFR BLD AUTO: 39.1 % (ref 42–52)
HGB BLD-MCNC: 14.6 GM/DL (ref 14–18)
IMM GRANULOCYTES # BLD AUTO: 0.03 THOU/MM3 (ref 0–0.07)
IMM GRANULOCYTES NFR BLD AUTO: 0.4 %
LYMPHOCYTES ABSOLUTE: 1.1 THOU/MM3 (ref 1–4.8)
LYMPHOCYTES NFR BLD AUTO: 16 %
MAGNESIUM SERPL-MCNC: 1.1 MG/DL (ref 1.6–2.4)
MCH RBC QN AUTO: 35.8 PG (ref 26–33)
MCHC RBC AUTO-ENTMCNC: 37.3 GM/DL (ref 32.2–35.5)
MCV RBC AUTO: 95.8 FL (ref 80–94)
MONOCYTES ABSOLUTE: 0.9 THOU/MM3 (ref 0.4–1.3)
MONOCYTES NFR BLD AUTO: 12.5 %
NEUTROPHILS NFR BLD AUTO: 68.6 %
NRBC BLD AUTO-RTO: 0 /100 WBC
OSMOLALITY SERPL CALC.SUM OF ELEC: 271.5 MOSMOL/KG (ref 275–300)
PLATELET # BLD AUTO: 75 THOU/MM3 (ref 130–400)
PLATELET BLD QL SMEAR: ABNORMAL
PMV BLD AUTO: 9.5 FL (ref 9.4–12.4)
POTASSIUM SERPL-SCNC: 3 MEQ/L (ref 3.5–5.2)
RBC # BLD AUTO: 4.08 MILL/MM3 (ref 4.7–6.1)
SARS-COV-2 RNA RESP QL NAA+PROBE: NOT DETECTED
SCAN OF BLOOD SMEAR: NORMAL
SEGMENTED NEUTROPHILS ABSOLUTE COUNT: 4.7 THOU/MM3 (ref 1.8–7.7)
SODIUM SERPL-SCNC: 136 MEQ/L (ref 135–145)
TROPONIN, HIGH SENSITIVITY: 10 NG/L (ref 0–12)
TROPONIN, HIGH SENSITIVITY: 9 NG/L (ref 0–12)
WBC # BLD AUTO: 6.9 THOU/MM3 (ref 4.8–10.8)

## 2023-10-31 PROCEDURE — 6370000000 HC RX 637 (ALT 250 FOR IP): Performed by: STUDENT IN AN ORGANIZED HEALTH CARE EDUCATION/TRAINING PROGRAM

## 2023-10-31 PROCEDURE — 99285 EMERGENCY DEPT VISIT HI MDM: CPT

## 2023-10-31 PROCEDURE — 2580000003 HC RX 258: Performed by: STUDENT IN AN ORGANIZED HEALTH CARE EDUCATION/TRAINING PROGRAM

## 2023-10-31 PROCEDURE — 96365 THER/PROPH/DIAG IV INF INIT: CPT

## 2023-10-31 PROCEDURE — 36415 COLL VENOUS BLD VENIPUNCTURE: CPT

## 2023-10-31 PROCEDURE — 93005 ELECTROCARDIOGRAM TRACING: CPT | Performed by: STUDENT IN AN ORGANIZED HEALTH CARE EDUCATION/TRAINING PROGRAM

## 2023-10-31 PROCEDURE — 96375 TX/PRO/DX INJ NEW DRUG ADDON: CPT

## 2023-10-31 PROCEDURE — 83735 ASSAY OF MAGNESIUM: CPT

## 2023-10-31 PROCEDURE — 84484 ASSAY OF TROPONIN QUANT: CPT

## 2023-10-31 PROCEDURE — 93010 ELECTROCARDIOGRAM REPORT: CPT | Performed by: NUCLEAR MEDICINE

## 2023-10-31 PROCEDURE — 87636 SARSCOV2 & INF A&B AMP PRB: CPT

## 2023-10-31 PROCEDURE — 71045 X-RAY EXAM CHEST 1 VIEW: CPT

## 2023-10-31 PROCEDURE — 85025 COMPLETE CBC W/AUTO DIFF WBC: CPT

## 2023-10-31 PROCEDURE — 80048 BASIC METABOLIC PNL TOTAL CA: CPT

## 2023-10-31 PROCEDURE — 6360000002 HC RX W HCPCS: Performed by: STUDENT IN AN ORGANIZED HEALTH CARE EDUCATION/TRAINING PROGRAM

## 2023-10-31 RX ORDER — 0.9 % SODIUM CHLORIDE 0.9 %
1000 INTRAVENOUS SOLUTION INTRAVENOUS ONCE
Status: COMPLETED | OUTPATIENT
Start: 2023-10-31 | End: 2023-10-31

## 2023-10-31 RX ORDER — MAGNESIUM SULFATE IN WATER 40 MG/ML
2000 INJECTION, SOLUTION INTRAVENOUS ONCE
Status: COMPLETED | OUTPATIENT
Start: 2023-10-31 | End: 2023-10-31

## 2023-10-31 RX ORDER — POTASSIUM CHLORIDE 750 MG/1
10 TABLET, EXTENDED RELEASE ORAL 2 TIMES DAILY
Qty: 14 TABLET | Refills: 0 | Status: SHIPPED | OUTPATIENT
Start: 2023-10-31 | End: 2023-11-07

## 2023-10-31 RX ORDER — POTASSIUM CHLORIDE 20 MEQ/1
40 TABLET, EXTENDED RELEASE ORAL ONCE
Status: COMPLETED | OUTPATIENT
Start: 2023-10-31 | End: 2023-10-31

## 2023-10-31 RX ORDER — KETOROLAC TROMETHAMINE 30 MG/ML
30 INJECTION, SOLUTION INTRAMUSCULAR; INTRAVENOUS ONCE
Status: COMPLETED | OUTPATIENT
Start: 2023-10-31 | End: 2023-10-31

## 2023-10-31 RX ORDER — MAGNESIUM 200 MG
200 TABLET ORAL DAILY
Qty: 7 TABLET | Refills: 0 | Status: SHIPPED | OUTPATIENT
Start: 2023-10-31 | End: 2023-11-07

## 2023-10-31 RX ADMIN — KETOROLAC TROMETHAMINE 30 MG: 30 INJECTION, SOLUTION INTRAMUSCULAR at 01:14

## 2023-10-31 RX ADMIN — POTASSIUM CHLORIDE 40 MEQ: 1500 TABLET, EXTENDED RELEASE ORAL at 01:24

## 2023-10-31 RX ADMIN — SODIUM CHLORIDE 1000 ML: 9 INJECTION, SOLUTION INTRAVENOUS at 01:14

## 2023-10-31 RX ADMIN — MAGNESIUM SULFATE HEPTAHYDRATE 2000 MG: 40 INJECTION, SOLUTION INTRAVENOUS at 01:27

## 2023-10-31 ASSESSMENT — PAIN SCALES - GENERAL
PAINLEVEL_OUTOF10: 7
PAINLEVEL_OUTOF10: 4

## 2023-10-31 ASSESSMENT — PAIN - FUNCTIONAL ASSESSMENT
PAIN_FUNCTIONAL_ASSESSMENT: 0-10
PAIN_FUNCTIONAL_ASSESSMENT: 0-10

## 2023-10-31 ASSESSMENT — PAIN DESCRIPTION - LOCATION: LOCATION: CHEST

## 2023-10-31 ASSESSMENT — PAIN DESCRIPTION - PAIN TYPE: TYPE: ACUTE PAIN

## 2023-10-31 ASSESSMENT — PAIN DESCRIPTION - DESCRIPTORS: DESCRIPTORS: PRESSURE

## 2023-10-31 NOTE — ED PROVIDER NOTES
[facility] DEPT  EMERGENCY DEPARTMENT ENCOUNTER          Pt Name: Tory Franklin  MRN: [id number]  [address] [facility] 1990  Date of evaluation: 10/31/2023  Physician: Caren Murcia       Chief Complaint   Patient presents with    Chest Pain         HISTORY OF PRESENT ILLNESS    HPI  Tory Franklin is a 27 y.o. male who presents to the emergency department from home, by private vehicle for evaluation of chest pain. Patient reports he has left upper chest pain. Start approximately 5 PM yesterday. Patient reports has been persistent since then. Patient reports it does not get any worse or better with ambulation. Patient reports he has been having a cough recently. Patient denies any nausea or vomiting. Patient denies any fevers or chills. The patient has no other acute complaints at this time. REVIEW OF SYSTEMS   Review of Systems   All other systems reviewed and are negative. PAST MEDICAL AND SURGICAL HISTORY     Past Medical History:   Diagnosis Date    Alcohol abuse 5/3/2022     Past Surgical History:   Procedure Laterality Date    KNEE SURGERY Left          MEDICATIONS   No current facility-administered medications for this encounter.     Current Outpatient Medications:     magnesium 200 MG TABS tablet, Take 1 tablet by mouth daily for 7 days, Disp: 7 tablet, Rfl: 0    potassium chloride (KLOR-CON M) 10 MEQ extended release tablet, Take 1 tablet by mouth 2 times daily for 7 days, Disp: 14 tablet, Rfl: 0    diclofenac sodium (VOLTAREN) 1 % GEL, Apply 4 g topically 4 times daily, Disp: 350 g, Rfl: 1    Elastic Bandages & Supports (MEDICAL COMPRESSION STOCKINGS) MISC, 1 each by Does not apply route daily, Disp: 1 each, Rfl: 0    amLODIPine (NORVASC) 5 MG tablet, Take 1 tablet by mouth daily, Disp: 30 tablet, Rfl: 1    magnesium oxide (MAG-OX) 400 (240 Mg) MG tablet, Take 2 tablets by mouth daily, Disp: 60 tablet, Rfl: 1    pantoprazole (PROTONIX)

## 2023-10-31 NOTE — ED TRIAGE NOTES
Pt presents to the Ed with c/o chest pain that started around 1400 yesterday. Pt states the pain is progressively worsening. Pt states he was not doing anything when the pain started. EKG complete.

## 2023-10-31 NOTE — ED NOTES
Discharge instructions and follow up discussed with pt. Pt verbalized understanding and denied further questions. LDA removed. Pt discharged with all belongings.         Donald Rosas RN  10/31/23 1522

## 2023-11-01 LAB
EKG ATRIAL RATE: 95 BPM
EKG P AXIS: 77 DEGREES
EKG P-R INTERVAL: 120 MS
EKG Q-T INTERVAL: 354 MS
EKG QRS DURATION: 90 MS
EKG QTC CALCULATION (BAZETT): 444 MS
EKG R AXIS: 83 DEGREES
EKG T AXIS: 79 DEGREES
EKG VENTRICULAR RATE: 95 BPM

## 2023-12-30 ENCOUNTER — HOSPITAL ENCOUNTER (EMERGENCY)
Age: 33
Discharge: LEFT BEFORE DISCHARGE | End: 2023-12-30
Payer: COMMERCIAL

## 2023-12-30 ENCOUNTER — APPOINTMENT (OUTPATIENT)
Dept: CT IMAGING | Age: 33
End: 2023-12-30
Payer: COMMERCIAL

## 2023-12-30 VITALS
RESPIRATION RATE: 16 BRPM | SYSTOLIC BLOOD PRESSURE: 120 MMHG | OXYGEN SATURATION: 99 % | DIASTOLIC BLOOD PRESSURE: 81 MMHG | HEIGHT: 70 IN | HEART RATE: 93 BPM | BODY MASS INDEX: 21.47 KG/M2 | TEMPERATURE: 97.9 F | WEIGHT: 150 LBS

## 2023-12-30 DIAGNOSIS — R55 SYNCOPE, UNSPECIFIED SYNCOPE TYPE: Primary | ICD-10-CM

## 2023-12-30 DIAGNOSIS — E83.42 HYPOMAGNESEMIA: ICD-10-CM

## 2023-12-30 DIAGNOSIS — E87.6 HYPOKALEMIA: ICD-10-CM

## 2023-12-30 DIAGNOSIS — E86.0 DEHYDRATION: ICD-10-CM

## 2023-12-30 LAB
ANION GAP SERPL CALC-SCNC: 21 MEQ/L (ref 8–16)
BASOPHILS ABSOLUTE: 0 THOU/MM3 (ref 0–0.1)
BASOPHILS NFR BLD AUTO: 0.4 %
BUN SERPL-MCNC: 8 MG/DL (ref 7–22)
CALCIUM SERPL-MCNC: 8.8 MG/DL (ref 8.5–10.5)
CHLORIDE SERPL-SCNC: 89 MEQ/L (ref 98–111)
CO2 SERPL-SCNC: 27 MEQ/L (ref 23–33)
CREAT SERPL-MCNC: 0.7 MG/DL (ref 0.4–1.2)
DEPRECATED RDW RBC AUTO: 44 FL (ref 35–45)
EKG ATRIAL RATE: 91 BPM
EKG P AXIS: 63 DEGREES
EKG P-R INTERVAL: 122 MS
EKG Q-T INTERVAL: 386 MS
EKG QRS DURATION: 84 MS
EKG QTC CALCULATION (BAZETT): 474 MS
EKG R AXIS: 82 DEGREES
EKG T AXIS: 76 DEGREES
EKG VENTRICULAR RATE: 91 BPM
EOSINOPHIL NFR BLD AUTO: 0.2 %
EOSINOPHILS ABSOLUTE: 0 THOU/MM3 (ref 0–0.4)
ERYTHROCYTE [DISTWIDTH] IN BLOOD BY AUTOMATED COUNT: 13 % (ref 11.5–14.5)
FLUAV RNA RESP QL NAA+PROBE: NOT DETECTED
FLUBV RNA RESP QL NAA+PROBE: NOT DETECTED
GFR SERPL CREATININE-BSD FRML MDRD: > 60 ML/MIN/1.73M2
GLUCOSE SERPL-MCNC: 116 MG/DL (ref 70–108)
HCT VFR BLD AUTO: 39.6 % (ref 42–52)
HGB BLD-MCNC: 14.5 GM/DL (ref 14–18)
IMM GRANULOCYTES # BLD AUTO: 0.04 THOU/MM3 (ref 0–0.07)
IMM GRANULOCYTES NFR BLD AUTO: 0.5 %
LYMPHOCYTES ABSOLUTE: 1.7 THOU/MM3 (ref 1–4.8)
LYMPHOCYTES NFR BLD AUTO: 21.5 %
MAGNESIUM SERPL-MCNC: 1.2 MG/DL (ref 1.6–2.4)
MCH RBC QN AUTO: 34.4 PG (ref 26–33)
MCHC RBC AUTO-ENTMCNC: 36.6 GM/DL (ref 32.2–35.5)
MCV RBC AUTO: 94.1 FL (ref 80–94)
MONOCYTES ABSOLUTE: 0.8 THOU/MM3 (ref 0.4–1.3)
MONOCYTES NFR BLD AUTO: 9.7 %
NEUTROPHILS NFR BLD AUTO: 67.7 %
NRBC BLD AUTO-RTO: 0 /100 WBC
OSMOLALITY SERPL CALC.SUM OF ELEC: 273.1 MOSMOL/KG (ref 275–300)
PLATELET # BLD AUTO: 75 THOU/MM3 (ref 130–400)
PLATELET BLD QL SMEAR: ABNORMAL
PMV BLD AUTO: 9.8 FL (ref 9.4–12.4)
POTASSIUM SERPL-SCNC: 3.1 MEQ/L (ref 3.5–5.2)
RBC # BLD AUTO: 4.21 MILL/MM3 (ref 4.7–6.1)
SARS-COV-2 RNA RESP QL NAA+PROBE: NOT DETECTED
SCAN OF BLOOD SMEAR: NORMAL
SEGMENTED NEUTROPHILS ABSOLUTE COUNT: 5.5 THOU/MM3 (ref 1.8–7.7)
SODIUM SERPL-SCNC: 137 MEQ/L (ref 135–145)
TARGETS BLD QL SMEAR: ABNORMAL
TROPONIN, HIGH SENSITIVITY: 8 NG/L (ref 0–12)
WBC # BLD AUTO: 8.1 THOU/MM3 (ref 4.8–10.8)

## 2023-12-30 PROCEDURE — 96375 TX/PRO/DX INJ NEW DRUG ADDON: CPT

## 2023-12-30 PROCEDURE — 85025 COMPLETE CBC W/AUTO DIFF WBC: CPT

## 2023-12-30 PROCEDURE — 80048 BASIC METABOLIC PNL TOTAL CA: CPT

## 2023-12-30 PROCEDURE — 83735 ASSAY OF MAGNESIUM: CPT

## 2023-12-30 PROCEDURE — 2580000003 HC RX 258: Performed by: PHYSICIAN ASSISTANT

## 2023-12-30 PROCEDURE — 96374 THER/PROPH/DIAG INJ IV PUSH: CPT

## 2023-12-30 PROCEDURE — 99284 EMERGENCY DEPT VISIT MOD MDM: CPT

## 2023-12-30 PROCEDURE — 93005 ELECTROCARDIOGRAM TRACING: CPT | Performed by: PHYSICIAN ASSISTANT

## 2023-12-30 PROCEDURE — 87636 SARSCOV2 & INF A&B AMP PRB: CPT

## 2023-12-30 PROCEDURE — 6370000000 HC RX 637 (ALT 250 FOR IP): Performed by: PHYSICIAN ASSISTANT

## 2023-12-30 PROCEDURE — 93010 ELECTROCARDIOGRAM REPORT: CPT | Performed by: INTERNAL MEDICINE

## 2023-12-30 PROCEDURE — 36415 COLL VENOUS BLD VENIPUNCTURE: CPT

## 2023-12-30 PROCEDURE — 6360000002 HC RX W HCPCS: Performed by: PHYSICIAN ASSISTANT

## 2023-12-30 PROCEDURE — 70450 CT HEAD/BRAIN W/O DYE: CPT

## 2023-12-30 PROCEDURE — 84484 ASSAY OF TROPONIN QUANT: CPT

## 2023-12-30 PROCEDURE — 96361 HYDRATE IV INFUSION ADD-ON: CPT

## 2023-12-30 RX ORDER — KETOROLAC TROMETHAMINE 30 MG/ML
30 INJECTION, SOLUTION INTRAMUSCULAR; INTRAVENOUS ONCE
Status: COMPLETED | OUTPATIENT
Start: 2023-12-30 | End: 2023-12-30

## 2023-12-30 RX ORDER — ONDANSETRON 2 MG/ML
4 INJECTION INTRAMUSCULAR; INTRAVENOUS ONCE
Status: COMPLETED | OUTPATIENT
Start: 2023-12-30 | End: 2023-12-30

## 2023-12-30 RX ORDER — LANOLIN ALCOHOL/MO/W.PET/CERES
800 CREAM (GRAM) TOPICAL ONCE
Status: COMPLETED | OUTPATIENT
Start: 2023-12-30 | End: 2023-12-30

## 2023-12-30 RX ORDER — 0.9 % SODIUM CHLORIDE 0.9 %
1000 INTRAVENOUS SOLUTION INTRAVENOUS ONCE
Status: COMPLETED | OUTPATIENT
Start: 2023-12-30 | End: 2023-12-30

## 2023-12-30 RX ORDER — POTASSIUM CHLORIDE 20 MEQ/1
40 TABLET, EXTENDED RELEASE ORAL ONCE
Status: COMPLETED | OUTPATIENT
Start: 2023-12-30 | End: 2023-12-30

## 2023-12-30 RX ADMIN — Medication 800 MG: at 09:11

## 2023-12-30 RX ADMIN — POTASSIUM CHLORIDE 40 MEQ: 1500 TABLET, EXTENDED RELEASE ORAL at 09:11

## 2023-12-30 RX ADMIN — SODIUM CHLORIDE 1000 ML: 9 INJECTION, SOLUTION INTRAVENOUS at 07:32

## 2023-12-30 RX ADMIN — KETOROLAC TROMETHAMINE 30 MG: 30 INJECTION, SOLUTION INTRAMUSCULAR; INTRAVENOUS at 07:34

## 2023-12-30 RX ADMIN — ONDANSETRON 4 MG: 2 INJECTION INTRAMUSCULAR; INTRAVENOUS at 07:33

## 2024-01-14 ENCOUNTER — APPOINTMENT (OUTPATIENT)
Dept: GENERAL RADIOLOGY | Age: 34
DRG: 283 | End: 2024-01-14

## 2024-01-14 ENCOUNTER — HOSPITAL ENCOUNTER (INPATIENT)
Age: 34
LOS: 1 days | DRG: 283 | End: 2024-01-15
Attending: EMERGENCY MEDICINE | Admitting: INTERNAL MEDICINE

## 2024-01-14 ENCOUNTER — APPOINTMENT (OUTPATIENT)
Dept: CT IMAGING | Age: 34
DRG: 283 | End: 2024-01-14

## 2024-01-14 DIAGNOSIS — N17.0 ATN (ACUTE TUBULAR NECROSIS) (HCC): ICD-10-CM

## 2024-01-14 DIAGNOSIS — F10.10 ALCOHOL ABUSE: ICD-10-CM

## 2024-01-14 DIAGNOSIS — I46.9 CARDIAC ARREST (HCC): Primary | ICD-10-CM

## 2024-01-14 PROBLEM — E87.20 METABOLIC ACIDOSIS: Status: ACTIVE | Noted: 2024-01-14

## 2024-01-14 PROBLEM — E87.5 HYPERKALEMIA: Status: ACTIVE | Noted: 2024-01-14

## 2024-01-14 LAB
ABO: NORMAL
ALBUMIN SERPL BCG-MCNC: 3 G/DL (ref 3.5–5.1)
ALBUMIN SERPL BCG-MCNC: 3 G/DL (ref 3.5–5.1)
ALP SERPL-CCNC: 225 U/L (ref 38–126)
ALP SERPL-CCNC: 94 U/L (ref 38–126)
ALT SERPL W/O P-5'-P-CCNC: 223 U/L (ref 11–66)
ALT SERPL W/O P-5'-P-CCNC: 56 U/L (ref 11–66)
AMMONIA PLAS-MCNC: 202 UMOL/L (ref 11–60)
AMPHETAMINES UR QL SCN: NEGATIVE
ANION GAP SERPL CALC-SCNC: 32 MEQ/L (ref 8–16)
ANION GAP SERPL CALC-SCNC: 35 MEQ/L (ref 8–16)
ANION GAP SERPL CALC-SCNC: 36 MEQ/L (ref 8–16)
ANISOCYTOSIS BLD QL SMEAR: PRESENT
ANTIBODY SCREEN: NORMAL
APTT PPP: 43.6 SECONDS (ref 22–38)
APTT PPP: 80.2 SECONDS (ref 22–38)
ARTERIAL PATENCY WRIST A: ABNORMAL
ARTERIAL PATENCY WRIST A: NEGATIVE
ARTERIAL PATENCY WRIST A: POSITIVE
AST SERPL-CCNC: 1068 U/L (ref 5–40)
AST SERPL-CCNC: 231 U/L (ref 5–40)
BACTERIA URNS QL MICRO: ABNORMAL /HPF
BARBITURATES UR QL SCN: NEGATIVE
BASE EXCESS BLDA CALC-SCNC: -16 MMOL/L (ref -2.5–2.5)
BASE EXCESS BLDA CALC-SCNC: -17.8 MMOL/L (ref -2.5–2.5)
BASE EXCESS BLDA CALC-SCNC: -20.3 MMOL/L (ref -2.5–2.5)
BASE EXCESS BLDA CALC-SCNC: -21.5 MMOL/L (ref -2.5–2.5)
BASE EXCESS BLDA CALC-SCNC: -25 MMOL/L (ref -2.5–2.5)
BASOPHILS ABSOLUTE: 0.1 THOU/MM3 (ref 0–0.1)
BASOPHILS NFR BLD AUTO: 0.4 %
BDY SITE: ABNORMAL
BENZODIAZ UR QL SCN: NEGATIVE
BILIRUB CONJ SERPL-MCNC: 5.1 MG/DL (ref 0–0.3)
BILIRUB SERPL-MCNC: 6 MG/DL (ref 0.3–1.2)
BILIRUB SERPL-MCNC: 6.8 MG/DL (ref 0.3–1.2)
BILIRUB UR QL STRIP.AUTO: ABNORMAL
BREATHS SETTING VENT: 12 BPM
BREATHS SETTING VENT: 20 BPM
BREATHS SETTING VENT: 20 BPM
BUN SERPL-MCNC: 13 MG/DL (ref 7–22)
BUN SERPL-MCNC: 17 MG/DL (ref 7–22)
BUN SERPL-MCNC: 17 MG/DL (ref 7–22)
BZE UR QL SCN: NEGATIVE
CA-I BLD ISE-SCNC: 0.77 MMOL/L (ref 1.12–1.32)
CA-I BLD ISE-SCNC: 0.87 MMOL/L (ref 1.12–1.32)
CA-I BLD ISE-SCNC: 0.99 MMOL/L (ref 1.12–1.32)
CALCIUM SERPL-MCNC: 6.4 MG/DL (ref 8.5–10.5)
CALCIUM SERPL-MCNC: 7.6 MG/DL (ref 8.5–10.5)
CALCIUM SERPL-MCNC: 9.1 MG/DL (ref 8.5–10.5)
CANNABINOIDS UR QL SCN: POSITIVE
CASTS #/AREA URNS LPF: ABNORMAL /LPF
CASTS 2: ABNORMAL /LPF
CHARACTER UR: ABNORMAL
CHLORIDE BLD-SCNC: 108 MEQ/L (ref 98–109)
CHLORIDE SERPL-SCNC: 90 MEQ/L (ref 98–111)
CHLORIDE SERPL-SCNC: 92 MEQ/L (ref 98–111)
CHLORIDE SERPL-SCNC: 94 MEQ/L (ref 98–111)
CK SERPL-CCNC: ABNORMAL U/L (ref 55–170)
CO2 SERPL-SCNC: 12 MEQ/L (ref 23–33)
CO2 SERPL-SCNC: 13 MEQ/L (ref 23–33)
CO2 SERPL-SCNC: 14 MEQ/L (ref 23–33)
COHGB MFR BLDV: 3.8 % CO SAT
COLLECTED BY:: ABNORMAL
COLOR: ABNORMAL
CORTIS SERPL-MCNC: 47.51 UG/DL
CORTISOL COLLECTION INFO: NORMAL
CREAT SERPL-MCNC: 2.3 MG/DL (ref 0.4–1.2)
CREAT SERPL-MCNC: 2.6 MG/DL (ref 0.4–1.2)
CREAT SERPL-MCNC: 3 MG/DL (ref 0.4–1.2)
CRYSTALS URNS MICRO: ABNORMAL
DACROCYTES: ABNORMAL
DEPRECATED RDW RBC AUTO: 66.7 FL (ref 35–45)
DEPRECATED RDW RBC AUTO: 67.4 FL (ref 35–45)
DEVICE: ABNORMAL
EOSINOPHIL NFR BLD AUTO: 0.1 %
EOSINOPHILS ABSOLUTE: 0 THOU/MM3 (ref 0–0.4)
EPITHELIAL CELLS, UA: ABNORMAL /HPF
ERYTHROCYTE [DISTWIDTH] IN BLOOD BY AUTOMATED COUNT: 16.2 % (ref 11.5–14.5)
ERYTHROCYTE [DISTWIDTH] IN BLOOD BY AUTOMATED COUNT: 16.7 % (ref 11.5–14.5)
ETHANOL SERPL-MCNC: < 0.01 %
FENTANYL: NEGATIVE
FIBRINOGEN PPP-MCNC: 197 MG/100ML (ref 155–475)
FIO2 ON VENT O2 ANALYZER: 100 %
FIO2 ON VENT O2 ANALYZER: 70 %
FIO2 ON VENT O2 ANALYZER: 75 %
FIO2 ON VENT O2 ANALYZER: 75 %
GFR SERPL CREATININE-BSD FRML MDRD: 27 ML/MIN/1.73M2
GFR SERPL CREATININE-BSD FRML MDRD: 32 ML/MIN/1.73M2
GFR SERPL CREATININE-BSD FRML MDRD: 37 ML/MIN/1.73M2
GLUCOSE BLD STRIP.AUTO-MCNC: 169 MG/DL (ref 70–108)
GLUCOSE BLD STRIP.AUTO-MCNC: 272 MG/DL (ref 70–108)
GLUCOSE BLD-MCNC: 142 MG/DL (ref 70–108)
GLUCOSE BLD-MCNC: 143 MG/DL (ref 70–108)
GLUCOSE BLD-MCNC: 23 MG/DL (ref 70–108)
GLUCOSE SERPL-MCNC: 149 MG/DL (ref 70–108)
GLUCOSE SERPL-MCNC: 28 MG/DL (ref 70–108)
GLUCOSE SERPL-MCNC: 30 MG/DL (ref 70–108)
GLUCOSE UR QL STRIP.AUTO: NEGATIVE MG/DL
HCO3 BLDA-SCNC: 10 MMOL/L (ref 23–28)
HCO3 BLDA-SCNC: 14 MMOL/L (ref 23–28)
HCO3 BLDA-SCNC: 16 MMOL/L (ref 23–28)
HCT VFR BLD AUTO: 24.6 % (ref 42–52)
HCT VFR BLD AUTO: 25.2 % (ref 42–52)
HCT VFR BLD AUTO: 34.6 % (ref 42–52)
HGB BLD-MCNC: 10.9 GM/DL (ref 14–18)
HGB BLD-MCNC: 7.7 GM/DL (ref 14–18)
HGB BLD-MCNC: 7.9 GM/DL (ref 14–18)
HGB UR QL STRIP.AUTO: ABNORMAL
IMM GRANULOCYTES # BLD AUTO: 2.27 THOU/MM3 (ref 0–0.07)
IMM GRANULOCYTES NFR BLD AUTO: 8.6 %
INR PPP: 2.06 (ref 0.85–1.13)
INR PPP: 2.26 (ref 0.85–1.13)
KETONES UR QL STRIP.AUTO: ABNORMAL
LACTATE BLD-SCNC: > 20 MMOL/L (ref 0.5–1.9)
LACTATE SERPL-SCNC: 20.9 MMOL/L (ref 0.5–2)
LACTATE SERPL-SCNC: 23 MMOL/L (ref 0.5–2)
LACTATE SERPL-SCNC: 23.7 MMOL/L (ref 0.5–2)
LYMPHOCYTES ABSOLUTE: 2.5 THOU/MM3 (ref 1–4.8)
LYMPHOCYTES NFR BLD AUTO: 9.3 %
MACROCYTES BLD QL SMEAR: PRESENT
MAGNESIUM SERPL-MCNC: 2.3 MG/DL (ref 1.6–2.4)
MAGNESIUM SERPL-MCNC: 2.5 MG/DL (ref 1.6–2.4)
MCH RBC QN AUTO: 35.3 PG (ref 26–33)
MCH RBC QN AUTO: 35.4 PG (ref 26–33)
MCHC RBC AUTO-ENTMCNC: 31.3 GM/DL (ref 32.2–35.5)
MCHC RBC AUTO-ENTMCNC: 31.5 GM/DL (ref 32.2–35.5)
MCV RBC AUTO: 112.3 FL (ref 80–94)
MCV RBC AUTO: 112.5 FL (ref 80–94)
MISCELLANEOUS 2: ABNORMAL
MONOCYTES ABSOLUTE: 1.3 THOU/MM3 (ref 0.4–1.3)
MONOCYTES NFR BLD AUTO: 4.8 %
MRSA DNA SPEC QL NAA+PROBE: NEGATIVE
NEUTROPHILS NFR BLD AUTO: 76.8 %
NITRITE UR QL STRIP: POSITIVE
NRBC BLD AUTO-RTO: 3 /100 WBC
OPIATES UR QL SCN: NEGATIVE
OSMOLALITY SERPL CALC.SUM OF ELEC: 277 MOSMOL/KG (ref 275–300)
OXYCODONE: NEGATIVE
PCO2 BLDA: 64 MMHG (ref 35–45)
PCO2 BLDA: 67 MMHG (ref 35–45)
PCO2 BLDA: 74 MMHG (ref 35–45)
PCO2 BLDA: 79 MMHG (ref 35–45)
PCO2 BLDA: 85 MMHG (ref 35–45)
PCP UR QL SCN: NEGATIVE
PEEP SETTING VENT: 10 MMHG
PEEP SETTING VENT: 12 MMHG
PEEP SETTING VENT: 12 MMHG
PH BLDA: 6.79 [PH] (ref 7.35–7.45)
PH BLDA: 6.82 [PH] (ref 7.35–7.45)
PH BLDA: 6.85 [PH] (ref 7.35–7.45)
PH BLDA: 6.94 [PH] (ref 7.35–7.45)
PH BLDA: 6.95 [PH] (ref 7.35–7.45)
PH UR STRIP.AUTO: 5 [PH] (ref 5–9)
PHOSPHATE SERPL-MCNC: 7.6 MG/DL (ref 2.4–4.7)
PHOSPHATE SERPL-MCNC: 9.1 MG/DL (ref 2.4–4.7)
PIP: 26 CMH2O
PIP: 30 CMH2O
PIP: 30 CMH2O
PLATELET # BLD AUTO: 149 THOU/MM3 (ref 130–400)
PLATELET # BLD AUTO: 79 THOU/MM3 (ref 130–400)
PMV BLD AUTO: 10.3 FL (ref 9.4–12.4)
PMV BLD AUTO: 10.8 FL (ref 9.4–12.4)
PO2 BLDA: 107 MMHG (ref 71–104)
PO2 BLDA: 178 MMHG (ref 71–104)
PO2 BLDA: 324 MMHG (ref 71–104)
PO2 BLDA: 79 MMHG (ref 71–104)
PO2 BLDA: 95 MMHG (ref 71–104)
POC CREATININE WHOLE BLOOD: 2.8 MG/DL (ref 0.5–1.2)
POTASSIUM BLD-SCNC: 4.5 MEQ/L (ref 3.5–4.9)
POTASSIUM SERPL-SCNC: 3.8 MEQ/L (ref 3.5–5.2)
POTASSIUM SERPL-SCNC: 5.2 MEQ/L (ref 3.5–5.2)
POTASSIUM SERPL-SCNC: 5.9 MEQ/L (ref 3.5–5.2)
PROT SERPL-MCNC: 5.8 G/DL (ref 6.1–8)
PROT SERPL-MCNC: 5.8 G/DL (ref 6.1–8)
PROT UR STRIP.AUTO-MCNC: 300 MG/DL
RBC # BLD AUTO: 2.24 MILL/MM3 (ref 4.7–6.1)
RBC # BLD AUTO: 3.08 MILL/MM3 (ref 4.7–6.1)
RBC URINE: ABNORMAL /HPF
RENAL EPI CELLS #/AREA URNS HPF: ABNORMAL /[HPF]
RH FACTOR: NORMAL
SAO2 % BLDA: 100 %
SAO2 % BLDA: 84 %
SAO2 % BLDA: 86 %
SAO2 % BLDA: 91 %
SAO2 % BLDA: 98 %
SCAN OF BLOOD SMEAR: NORMAL
SEGMENTED NEUTROPHILS ABSOLUTE COUNT: 20.3 THOU/MM3 (ref 1.8–7.7)
SODIUM BLD-SCNC: 140 MEQ/L (ref 138–146)
SODIUM SERPL-SCNC: 135 MEQ/L (ref 135–145)
SODIUM SERPL-SCNC: 140 MEQ/L (ref 135–145)
SODIUM SERPL-SCNC: 143 MEQ/L (ref 135–145)
SP GR UR REFRACT.AUTO: 1.02 (ref 1–1.03)
T4 FREE SERPL-MCNC: 1.03 NG/DL (ref 0.93–1.76)
TSH SERPL DL<=0.005 MIU/L-ACNC: 9.39 UIU/ML (ref 0.4–4.2)
UROBILINOGEN, URINE: 1 EU/DL (ref 0–1)
VENTILATION MODE VENT: ABNORMAL
VENTILATION MODE VENT: ABNORMAL
WBC # BLD AUTO: 12.9 THOU/MM3 (ref 4.8–10.8)
WBC # BLD AUTO: 26.4 THOU/MM3 (ref 4.8–10.8)
WBC #/AREA URNS HPF: ABNORMAL /HPF
WBC #/AREA URNS HPF: ABNORMAL /[HPF]
YEAST LIKE FUNGI URNS QL MICRO: ABNORMAL

## 2024-01-14 PROCEDURE — 2500000003 HC RX 250 WO HCPCS: Performed by: STUDENT IN AN ORGANIZED HEALTH CARE EDUCATION/TRAINING PROGRAM

## 2024-01-14 PROCEDURE — 74177 CT ABD & PELVIS W/CONTRAST: CPT

## 2024-01-14 PROCEDURE — 82140 ASSAY OF AMMONIA: CPT

## 2024-01-14 PROCEDURE — 82947 ASSAY GLUCOSE BLOOD QUANT: CPT

## 2024-01-14 PROCEDURE — 3E033XZ INTRODUCTION OF VASOPRESSOR INTO PERIPHERAL VEIN, PERCUTANEOUS APPROACH: ICD-10-PCS | Performed by: EMERGENCY MEDICINE

## 2024-01-14 PROCEDURE — 02HV33Z INSERTION OF INFUSION DEVICE INTO SUPERIOR VENA CAVA, PERCUTANEOUS APPROACH: ICD-10-PCS | Performed by: EMERGENCY MEDICINE

## 2024-01-14 PROCEDURE — 36800 INSERTION OF CANNULA: CPT | Performed by: INTERNAL MEDICINE

## 2024-01-14 PROCEDURE — 87040 BLOOD CULTURE FOR BACTERIA: CPT

## 2024-01-14 PROCEDURE — 81001 URINALYSIS AUTO W/SCOPE: CPT

## 2024-01-14 PROCEDURE — 31500 INSERT EMERGENCY AIRWAY: CPT

## 2024-01-14 PROCEDURE — 82550 ASSAY OF CK (CPK): CPT

## 2024-01-14 PROCEDURE — 36600 WITHDRAWAL OF ARTERIAL BLOOD: CPT

## 2024-01-14 PROCEDURE — 2580000003 HC RX 258: Performed by: INTERNAL MEDICINE

## 2024-01-14 PROCEDURE — 6360000002 HC RX W HCPCS: Performed by: STUDENT IN AN ORGANIZED HEALTH CARE EDUCATION/TRAINING PROGRAM

## 2024-01-14 PROCEDURE — 80307 DRUG TEST PRSMV CHEM ANLYZR: CPT

## 2024-01-14 PROCEDURE — 87641 MR-STAPH DNA AMP PROBE: CPT

## 2024-01-14 PROCEDURE — 82435 ASSAY OF BLOOD CHLORIDE: CPT

## 2024-01-14 PROCEDURE — 82948 REAGENT STRIP/BLOOD GLUCOSE: CPT

## 2024-01-14 PROCEDURE — 2500000003 HC RX 250 WO HCPCS

## 2024-01-14 PROCEDURE — 87086 URINE CULTURE/COLONY COUNT: CPT

## 2024-01-14 PROCEDURE — 99285 EMERGENCY DEPT VISIT HI MDM: CPT

## 2024-01-14 PROCEDURE — 85025 COMPLETE CBC W/AUTO DIFF WBC: CPT

## 2024-01-14 PROCEDURE — 99254 IP/OBS CNSLTJ NEW/EST MOD 60: CPT | Performed by: INTERNAL MEDICINE

## 2024-01-14 PROCEDURE — 37799 UNLISTED PX VASCULAR SURGERY: CPT

## 2024-01-14 PROCEDURE — 84132 ASSAY OF SERUM POTASSIUM: CPT

## 2024-01-14 PROCEDURE — 85730 THROMBOPLASTIN TIME PARTIAL: CPT

## 2024-01-14 PROCEDURE — 82375 ASSAY CARBOXYHB QUANT: CPT

## 2024-01-14 PROCEDURE — 83605 ASSAY OF LACTIC ACID: CPT

## 2024-01-14 PROCEDURE — 70450 CT HEAD/BRAIN W/O DYE: CPT

## 2024-01-14 PROCEDURE — 0BH17EZ INSERTION OF ENDOTRACHEAL AIRWAY INTO TRACHEA, VIA NATURAL OR ARTIFICIAL OPENING: ICD-10-PCS | Performed by: EMERGENCY MEDICINE

## 2024-01-14 PROCEDURE — 85027 COMPLETE CBC AUTOMATED: CPT

## 2024-01-14 PROCEDURE — 2580000003 HC RX 258: Performed by: STUDENT IN AN ORGANIZED HEALTH CARE EDUCATION/TRAINING PROGRAM

## 2024-01-14 PROCEDURE — 93005 ELECTROCARDIOGRAM TRACING: CPT | Performed by: EMERGENCY MEDICINE

## 2024-01-14 PROCEDURE — 6360000002 HC RX W HCPCS

## 2024-01-14 PROCEDURE — P9047 ALBUMIN (HUMAN), 25%, 50ML: HCPCS

## 2024-01-14 PROCEDURE — 71045 X-RAY EXAM CHEST 1 VIEW: CPT

## 2024-01-14 PROCEDURE — 94761 N-INVAS EAR/PLS OXIMETRY MLT: CPT

## 2024-01-14 PROCEDURE — 82330 ASSAY OF CALCIUM: CPT

## 2024-01-14 PROCEDURE — 30233N1 TRANSFUSION OF NONAUTOLOGOUS RED BLOOD CELLS INTO PERIPHERAL VEIN, PERCUTANEOUS APPROACH: ICD-10-PCS | Performed by: INTERNAL MEDICINE

## 2024-01-14 PROCEDURE — 86923 COMPATIBILITY TEST ELECTRIC: CPT

## 2024-01-14 PROCEDURE — 87801 DETECT AGNT MULT DNA AMPLI: CPT

## 2024-01-14 PROCEDURE — 2580000003 HC RX 258: Performed by: EMERGENCY MEDICINE

## 2024-01-14 PROCEDURE — 99255 IP/OBS CONSLTJ NEW/EST HI 80: CPT | Performed by: INTERNAL MEDICINE

## 2024-01-14 PROCEDURE — 71275 CT ANGIOGRAPHY CHEST: CPT

## 2024-01-14 PROCEDURE — 36415 COLL VENOUS BLD VENIPUNCTURE: CPT

## 2024-01-14 PROCEDURE — 84295 ASSAY OF SERUM SODIUM: CPT

## 2024-01-14 PROCEDURE — 2500000003 HC RX 250 WO HCPCS: Performed by: EMERGENCY MEDICINE

## 2024-01-14 PROCEDURE — 82803 BLOOD GASES ANY COMBINATION: CPT

## 2024-01-14 PROCEDURE — P9016 RBC LEUKOCYTES REDUCED: HCPCS

## 2024-01-14 PROCEDURE — 36620 INSERTION CATHETER ARTERY: CPT | Performed by: INTERNAL MEDICINE

## 2024-01-14 PROCEDURE — P9041 ALBUMIN (HUMAN),5%, 50ML: HCPCS

## 2024-01-14 PROCEDURE — 85610 PROTHROMBIN TIME: CPT

## 2024-01-14 PROCEDURE — 86850 RBC ANTIBODY SCREEN: CPT

## 2024-01-14 PROCEDURE — 84443 ASSAY THYROID STIM HORMONE: CPT

## 2024-01-14 PROCEDURE — 36556 INSERT NON-TUNNEL CV CATH: CPT

## 2024-01-14 PROCEDURE — 6360000002 HC RX W HCPCS: Performed by: EMERGENCY MEDICINE

## 2024-01-14 PROCEDURE — 87070 CULTURE OTHR SPECIMN AEROBIC: CPT

## 2024-01-14 PROCEDURE — 85014 HEMATOCRIT: CPT

## 2024-01-14 PROCEDURE — 86901 BLOOD TYPING SEROLOGIC RH(D): CPT

## 2024-01-14 PROCEDURE — 2000000000 HC ICU R&B

## 2024-01-14 PROCEDURE — 85384 FIBRINOGEN ACTIVITY: CPT

## 2024-01-14 PROCEDURE — 83735 ASSAY OF MAGNESIUM: CPT

## 2024-01-14 PROCEDURE — 72125 CT NECK SPINE W/O DYE: CPT

## 2024-01-14 PROCEDURE — 84100 ASSAY OF PHOSPHORUS: CPT

## 2024-01-14 PROCEDURE — 2580000003 HC RX 258: Performed by: NURSE PRACTITIONER

## 2024-01-14 PROCEDURE — 80053 COMPREHEN METABOLIC PANEL: CPT

## 2024-01-14 PROCEDURE — 03HC33Z INSERTION OF INFUSION DEVICE INTO LEFT RADIAL ARTERY, PERCUTANEOUS APPROACH: ICD-10-PCS | Performed by: INTERNAL MEDICINE

## 2024-01-14 PROCEDURE — 82248 BILIRUBIN DIRECT: CPT

## 2024-01-14 PROCEDURE — 86900 BLOOD TYPING SEROLOGIC ABO: CPT

## 2024-01-14 PROCEDURE — 5A1935Z RESPIRATORY VENTILATION, LESS THAN 24 CONSECUTIVE HOURS: ICD-10-PCS | Performed by: EMERGENCY MEDICINE

## 2024-01-14 PROCEDURE — 94002 VENT MGMT INPAT INIT DAY: CPT

## 2024-01-14 PROCEDURE — 82077 ASSAY SPEC XCP UR&BREATH IA: CPT

## 2024-01-14 PROCEDURE — 4A133B1 MONITORING OF ARTERIAL PRESSURE, PERIPHERAL, PERCUTANEOUS APPROACH: ICD-10-PCS | Performed by: INTERNAL MEDICINE

## 2024-01-14 PROCEDURE — 84439 ASSAY OF FREE THYROXINE: CPT

## 2024-01-14 PROCEDURE — 87147 CULTURE TYPE IMMUNOLOGIC: CPT

## 2024-01-14 PROCEDURE — 6360000002 HC RX W HCPCS: Performed by: INTERNAL MEDICINE

## 2024-01-14 PROCEDURE — 85018 HEMOGLOBIN: CPT

## 2024-01-14 PROCEDURE — 4A133J1 MONITORING OF ARTERIAL PULSE, PERIPHERAL, PERCUTANEOUS APPROACH: ICD-10-PCS | Performed by: INTERNAL MEDICINE

## 2024-01-14 PROCEDURE — 2700000000 HC OXYGEN THERAPY PER DAY

## 2024-01-14 PROCEDURE — 5A12012 PERFORMANCE OF CARDIAC OUTPUT, SINGLE, MANUAL: ICD-10-PCS | Performed by: INTERNAL MEDICINE

## 2024-01-14 PROCEDURE — 87077 CULTURE AEROBIC IDENTIFY: CPT

## 2024-01-14 PROCEDURE — 6360000004 HC RX CONTRAST MEDICATION: Performed by: INTERNAL MEDICINE

## 2024-01-14 PROCEDURE — 99291 CRITICAL CARE FIRST HOUR: CPT | Performed by: INTERNAL MEDICINE

## 2024-01-14 PROCEDURE — 2580000003 HC RX 258

## 2024-01-14 PROCEDURE — 5A1D70Z PERFORMANCE OF URINARY FILTRATION, INTERMITTENT, LESS THAN 6 HOURS PER DAY: ICD-10-PCS | Performed by: INTERNAL MEDICINE

## 2024-01-14 PROCEDURE — 82565 ASSAY OF CREATININE: CPT

## 2024-01-14 PROCEDURE — 82533 TOTAL CORTISOL: CPT

## 2024-01-14 RX ORDER — SODIUM CHLORIDE 9 MG/ML
INJECTION, SOLUTION INTRAVENOUS CONTINUOUS
Status: DISCONTINUED | OUTPATIENT
Start: 2024-01-14 | End: 2024-01-14

## 2024-01-14 RX ORDER — NOREPINEPHRINE BITARTRATE 0.06 MG/ML
INJECTION, SOLUTION INTRAVENOUS
Status: COMPLETED
Start: 2024-01-14 | End: 2024-01-14

## 2024-01-14 RX ORDER — CALCIUM GLUCONATE 20 MG/ML
2000 INJECTION, SOLUTION INTRAVENOUS PRN
Status: DISCONTINUED | OUTPATIENT
Start: 2024-01-14 | End: 2024-01-15 | Stop reason: HOSPADM

## 2024-01-14 RX ORDER — HEPARIN SODIUM 5000 [USP'U]/ML
5000 INJECTION, SOLUTION INTRAVENOUS; SUBCUTANEOUS EVERY 8 HOURS SCHEDULED
Status: DISCONTINUED | OUTPATIENT
Start: 2024-01-14 | End: 2024-01-15 | Stop reason: HOSPADM

## 2024-01-14 RX ORDER — DEXTROSE MONOHYDRATE 100 MG/ML
INJECTION, SOLUTION INTRAVENOUS CONTINUOUS
Status: DISCONTINUED | OUTPATIENT
Start: 2024-01-14 | End: 2024-01-14

## 2024-01-14 RX ORDER — LACTULOSE 10 G/15ML
20 SOLUTION ORAL 2 TIMES DAILY
Status: DISCONTINUED | OUTPATIENT
Start: 2024-01-15 | End: 2024-01-15 | Stop reason: HOSPADM

## 2024-01-14 RX ORDER — MAGNESIUM SULFATE 1 G/100ML
1000 INJECTION INTRAVENOUS PRN
Status: DISCONTINUED | OUTPATIENT
Start: 2024-01-14 | End: 2024-01-15 | Stop reason: HOSPADM

## 2024-01-14 RX ORDER — ALBUMIN (HUMAN) 12.5 G/50ML
SOLUTION INTRAVENOUS
Status: COMPLETED
Start: 2024-01-14 | End: 2024-01-14

## 2024-01-14 RX ORDER — FOLIC ACID 5 MG/ML
1 INJECTION, SOLUTION INTRAMUSCULAR; INTRAVENOUS; SUBCUTANEOUS DAILY
Status: DISCONTINUED | OUTPATIENT
Start: 2024-01-14 | End: 2024-01-15 | Stop reason: HOSPADM

## 2024-01-14 RX ORDER — SODIUM CHLORIDE 0.9 % (FLUSH) 0.9 %
5-40 SYRINGE (ML) INJECTION EVERY 12 HOURS SCHEDULED
Status: DISCONTINUED | OUTPATIENT
Start: 2024-01-14 | End: 2024-01-15 | Stop reason: HOSPADM

## 2024-01-14 RX ORDER — POTASSIUM CHLORIDE 7.45 MG/ML
10 INJECTION INTRAVENOUS PRN
Status: DISCONTINUED | OUTPATIENT
Start: 2024-01-14 | End: 2024-01-15 | Stop reason: HOSPADM

## 2024-01-14 RX ORDER — DEXTROSE AND SODIUM CHLORIDE 5; .45 G/100ML; G/100ML
INJECTION, SOLUTION INTRAVENOUS CONTINUOUS
Status: DISCONTINUED | OUTPATIENT
Start: 2024-01-14 | End: 2024-01-15

## 2024-01-14 RX ORDER — ACETAMINOPHEN 650 MG/1
650 SUPPOSITORY RECTAL EVERY 6 HOURS PRN
Status: DISCONTINUED | OUTPATIENT
Start: 2024-01-14 | End: 2024-01-15 | Stop reason: HOSPADM

## 2024-01-14 RX ORDER — ALBUMIN (HUMAN) 12.5 G/50ML
25 SOLUTION INTRAVENOUS ONCE
Status: COMPLETED | OUTPATIENT
Start: 2024-01-14 | End: 2024-01-14

## 2024-01-14 RX ORDER — MIDAZOLAM HYDROCHLORIDE 1 MG/ML
1-10 INJECTION, SOLUTION INTRAVENOUS CONTINUOUS
Status: DISCONTINUED | OUTPATIENT
Start: 2024-01-14 | End: 2024-01-15 | Stop reason: HOSPADM

## 2024-01-14 RX ORDER — ALBUMIN, HUMAN INJ 5% 5 %
25 SOLUTION INTRAVENOUS ONCE
Status: COMPLETED | OUTPATIENT
Start: 2024-01-14 | End: 2024-01-14

## 2024-01-14 RX ORDER — 0.9 % SODIUM CHLORIDE 0.9 %
1000 INTRAVENOUS SOLUTION INTRAVENOUS
Status: DISCONTINUED | OUTPATIENT
Start: 2024-01-14 | End: 2024-01-15 | Stop reason: HOSPADM

## 2024-01-14 RX ORDER — POTASSIUM CHLORIDE 29.8 MG/ML
20 INJECTION INTRAVENOUS PRN
Status: DISCONTINUED | OUTPATIENT
Start: 2024-01-14 | End: 2024-01-15 | Stop reason: HOSPADM

## 2024-01-14 RX ORDER — ACETAMINOPHEN 325 MG/1
650 TABLET ORAL EVERY 6 HOURS PRN
Status: DISCONTINUED | OUTPATIENT
Start: 2024-01-14 | End: 2024-01-15 | Stop reason: HOSPADM

## 2024-01-14 RX ORDER — MAGNESIUM SULFATE HEPTAHYDRATE 500 MG/ML
INJECTION, SOLUTION INTRAMUSCULAR; INTRAVENOUS DAILY PRN
Status: COMPLETED | OUTPATIENT
Start: 2024-01-14 | End: 2024-01-14

## 2024-01-14 RX ORDER — ONDANSETRON 2 MG/ML
4 INJECTION INTRAMUSCULAR; INTRAVENOUS EVERY 6 HOURS PRN
Status: DISCONTINUED | OUTPATIENT
Start: 2024-01-14 | End: 2024-01-15 | Stop reason: HOSPADM

## 2024-01-14 RX ORDER — CALCIUM GLUCONATE 10 MG/ML
1000 INJECTION, SOLUTION INTRAVENOUS PRN
Status: DISCONTINUED | OUTPATIENT
Start: 2024-01-14 | End: 2024-01-15 | Stop reason: HOSPADM

## 2024-01-14 RX ORDER — ONDANSETRON 4 MG/1
4 TABLET, ORALLY DISINTEGRATING ORAL EVERY 8 HOURS PRN
Status: DISCONTINUED | OUTPATIENT
Start: 2024-01-14 | End: 2024-01-15 | Stop reason: HOSPADM

## 2024-01-14 RX ORDER — CALCIUM CHLORIDE, MAGNESIUM CHLORIDE, DEXTROSE MONOHYDRATE, LACTIC ACID, SODIUM CHLORIDE, SODIUM BICARBONATE AND POTASSIUM CHLORIDE 5.15; 2.03; 22; 5.4; 6.46; 3.09; .157 G/L; G/L; G/L; G/L; G/L; G/L; G/L
INJECTION INTRAVENOUS CONTINUOUS
Status: DISCONTINUED | OUTPATIENT
Start: 2024-01-14 | End: 2024-01-14

## 2024-01-14 RX ORDER — ALBUMIN (HUMAN) 12.5 G/50ML
12.5 SOLUTION INTRAVENOUS EVERY 6 HOURS
Status: DISCONTINUED | OUTPATIENT
Start: 2024-01-15 | End: 2024-01-15 | Stop reason: HOSPADM

## 2024-01-14 RX ORDER — SODIUM CHLORIDE 9 MG/ML
INJECTION, SOLUTION INTRAVENOUS PRN
Status: DISCONTINUED | OUTPATIENT
Start: 2024-01-14 | End: 2024-01-15 | Stop reason: HOSPADM

## 2024-01-14 RX ORDER — CYANOCOBALAMIN 1000 UG/ML
1000 INJECTION, SOLUTION INTRAMUSCULAR; SUBCUTANEOUS ONCE
Status: COMPLETED | OUTPATIENT
Start: 2024-01-14 | End: 2024-01-14

## 2024-01-14 RX ORDER — SODIUM CHLORIDE 0.9 % (FLUSH) 0.9 %
5-40 SYRINGE (ML) INJECTION PRN
Status: DISCONTINUED | OUTPATIENT
Start: 2024-01-14 | End: 2024-01-15 | Stop reason: HOSPADM

## 2024-01-14 RX ORDER — MAGNESIUM SULFATE IN WATER 40 MG/ML
2000 INJECTION, SOLUTION INTRAVENOUS PRN
Status: DISCONTINUED | OUTPATIENT
Start: 2024-01-14 | End: 2024-01-15 | Stop reason: HOSPADM

## 2024-01-14 RX ORDER — POLYETHYLENE GLYCOL 3350 17 G/17G
17 POWDER, FOR SOLUTION ORAL DAILY PRN
Status: DISCONTINUED | OUTPATIENT
Start: 2024-01-14 | End: 2024-01-15 | Stop reason: HOSPADM

## 2024-01-14 RX ORDER — IPRATROPIUM BROMIDE AND ALBUTEROL SULFATE 2.5; .5 MG/3ML; MG/3ML
1 SOLUTION RESPIRATORY (INHALATION) EVERY 4 HOURS PRN
Status: DISCONTINUED | OUTPATIENT
Start: 2024-01-14 | End: 2024-01-15 | Stop reason: HOSPADM

## 2024-01-14 RX ORDER — THIAMINE HYDROCHLORIDE 100 MG/ML
100 INJECTION, SOLUTION INTRAMUSCULAR; INTRAVENOUS DAILY
Status: DISCONTINUED | OUTPATIENT
Start: 2024-01-14 | End: 2024-01-15 | Stop reason: HOSPADM

## 2024-01-14 RX ORDER — PANTOPRAZOLE SODIUM 40 MG/10ML
40 INJECTION, POWDER, LYOPHILIZED, FOR SOLUTION INTRAVENOUS DAILY
Status: DISCONTINUED | OUTPATIENT
Start: 2024-01-15 | End: 2024-01-15 | Stop reason: HOSPADM

## 2024-01-14 RX ORDER — 0.9 % SODIUM CHLORIDE 0.9 %
1000 INTRAVENOUS SOLUTION INTRAVENOUS ONCE
Status: COMPLETED | OUTPATIENT
Start: 2024-01-14 | End: 2024-01-14

## 2024-01-14 RX ORDER — EPINEPHRINE IN SOD CHLOR,ISO 1 MG/10 ML
SYRINGE (ML) INTRAVENOUS DAILY PRN
Status: COMPLETED | OUTPATIENT
Start: 2024-01-14 | End: 2024-01-14

## 2024-01-14 RX ORDER — ALBUMIN, HUMAN INJ 5% 5 %
SOLUTION INTRAVENOUS
Status: COMPLETED
Start: 2024-01-14 | End: 2024-01-14

## 2024-01-14 RX ORDER — NOREPINEPHRINE BITARTRATE 0.06 MG/ML
1-100 INJECTION, SOLUTION INTRAVENOUS CONTINUOUS
Status: DISPENSED | OUTPATIENT
Start: 2024-01-14 | End: 2024-01-14

## 2024-01-14 RX ORDER — FENTANYL CITRATE-0.9 % NACL/PF 10 MCG/ML
25-200 PLASTIC BAG, INJECTION (ML) INTRAVENOUS CONTINUOUS
Status: DISCONTINUED | OUTPATIENT
Start: 2024-01-14 | End: 2024-01-15 | Stop reason: HOSPADM

## 2024-01-14 RX ADMIN — PHENYLEPHRINE HYDROCHLORIDE 300 MCG/MIN: 10 INJECTION INTRAVENOUS at 20:47

## 2024-01-14 RX ADMIN — Medication 100 MEQ: at 19:45

## 2024-01-14 RX ADMIN — CALCIUM CHLORIDE, MAGNESIUM CHLORIDE, DEXTROSE MONOHYDRATE, LACTIC ACID, SODIUM CHLORIDE, SODIUM BICARBONATE AND POTASSIUM CHLORIDE: 5.15; 2.03; 22; 5.4; 6.46; 3.09; .157 INJECTION INTRAVENOUS at 23:15

## 2024-01-14 RX ADMIN — SODIUM BICARBONATE: 84 INJECTION, SOLUTION INTRAVENOUS at 20:43

## 2024-01-14 RX ADMIN — SODIUM BICARBONATE 100 MEQ: 84 INJECTION, SOLUTION INTRAVENOUS at 19:45

## 2024-01-14 RX ADMIN — Medication 30 MCG/MIN: at 22:52

## 2024-01-14 RX ADMIN — SODIUM BICARBONATE 100 MEQ: 84 INJECTION, SOLUTION INTRAVENOUS at 18:28

## 2024-01-14 RX ADMIN — CALCIUM GLUCONATE 2000 MG: 20 INJECTION, SOLUTION INTRAVENOUS at 17:07

## 2024-01-14 RX ADMIN — Medication: at 23:34

## 2024-01-14 RX ADMIN — FOLIC ACID 1 MG: 5 INJECTION, SOLUTION INTRAMUSCULAR; INTRAVENOUS; SUBCUTANEOUS at 15:27

## 2024-01-14 RX ADMIN — ALBUMIN (HUMAN) 25 G: 12.5 INJECTION, SOLUTION INTRAVENOUS at 18:25

## 2024-01-14 RX ADMIN — PHENYLEPHRINE HYDROCHLORIDE 300 MCG/MIN: 10 INJECTION INTRAVENOUS at 23:51

## 2024-01-14 RX ADMIN — HEPARIN SODIUM 5000 UNITS: 5000 INJECTION INTRAVENOUS; SUBCUTANEOUS at 22:08

## 2024-01-14 RX ADMIN — SODIUM BICARBONATE 50 MEQ: 84 INJECTION, SOLUTION INTRAVENOUS at 11:08

## 2024-01-14 RX ADMIN — Medication 30 MCG/MIN: at 19:48

## 2024-01-14 RX ADMIN — Medication 10 MCG: at 11:25

## 2024-01-14 RX ADMIN — Medication 100 MCG/MIN: at 17:17

## 2024-01-14 RX ADMIN — Medication 100 MCG/MIN: at 14:24

## 2024-01-14 RX ADMIN — Medication 1 MCG: at 11:35

## 2024-01-14 RX ADMIN — Medication: at 23:32

## 2024-01-14 RX ADMIN — DEXTROSE AND SODIUM CHLORIDE: 5; 450 INJECTION, SOLUTION INTRAVENOUS at 20:12

## 2024-01-14 RX ADMIN — ALBUMIN, HUMAN INJ 5% 25 G: 5 SOLUTION at 18:25

## 2024-01-14 RX ADMIN — Medication 1 MCG: at 11:32

## 2024-01-14 RX ADMIN — THIAMINE HYDROCHLORIDE 100 MG: 100 INJECTION, SOLUTION INTRAMUSCULAR; INTRAVENOUS at 15:56

## 2024-01-14 RX ADMIN — VASOPRESSIN 0.04 UNITS/MIN: 20 INJECTION INTRAVENOUS at 11:47

## 2024-01-14 RX ADMIN — SODIUM CHLORIDE 1000 ML: 9 INJECTION, SOLUTION INTRAVENOUS at 21:33

## 2024-01-14 RX ADMIN — CYANOCOBALAMIN 1000 MCG: 1000 INJECTION, SOLUTION INTRAMUSCULAR; SUBCUTANEOUS at 15:56

## 2024-01-14 RX ADMIN — DEXTROSE MONOHYDRATE: 100 INJECTION, SOLUTION INTRAVENOUS at 15:23

## 2024-01-14 RX ADMIN — Medication 1 MG: at 11:07

## 2024-01-14 RX ADMIN — SODIUM BICARBONATE 50 MEQ: 84 INJECTION, SOLUTION INTRAVENOUS at 11:28

## 2024-01-14 RX ADMIN — Medication 1 MG: at 11:11

## 2024-01-14 RX ADMIN — SODIUM BICARBONATE: 84 INJECTION, SOLUTION INTRAVENOUS at 12:04

## 2024-01-14 RX ADMIN — Medication 2 MCG/MIN: at 14:19

## 2024-01-14 RX ADMIN — ALBUMIN (HUMAN) 25 G: 12.5 SOLUTION INTRAVENOUS at 19:45

## 2024-01-14 RX ADMIN — Medication 2 MCG/MIN: at 14:23

## 2024-01-14 RX ADMIN — CALCIUM CHLORIDE, MAGNESIUM CHLORIDE, DEXTROSE MONOHYDRATE, LACTIC ACID, SODIUM CHLORIDE, SODIUM BICARBONATE AND POTASSIUM CHLORIDE: 5.15; 2.03; 22; 5.4; 6.46; 3.09; .157 INJECTION INTRAVENOUS at 16:32

## 2024-01-14 RX ADMIN — Medication 1 MG: at 11:04

## 2024-01-14 RX ADMIN — ALBUMIN (HUMAN) 25 G: 0.25 INJECTION, SOLUTION INTRAVENOUS at 19:45

## 2024-01-14 RX ADMIN — Medication 100 MEQ: at 18:28

## 2024-01-14 RX ADMIN — Medication 100 MCG/MIN: at 20:19

## 2024-01-14 RX ADMIN — Medication: at 17:08

## 2024-01-14 RX ADMIN — MAGNESIUM SULFATE HEPTAHYDRATE 1000 MG: 500 INJECTION, SOLUTION INTRAMUSCULAR; INTRAVENOUS at 11:12

## 2024-01-14 RX ADMIN — Medication: at 23:16

## 2024-01-14 RX ADMIN — Medication 1 MCG: at 11:37

## 2024-01-14 RX ADMIN — SODIUM BICARBONATE 50 MEQ: 84 INJECTION, SOLUTION INTRAVENOUS at 11:40

## 2024-01-14 RX ADMIN — CEFTRIAXONE SODIUM 1000 MG: 1 INJECTION, POWDER, FOR SOLUTION INTRAMUSCULAR; INTRAVENOUS at 15:38

## 2024-01-14 RX ADMIN — SODIUM BICARBONATE 100 MEQ: 84 INJECTION, SOLUTION INTRAVENOUS at 17:40

## 2024-01-14 RX ADMIN — SODIUM CHLORIDE 100 MCG/MIN: 9 INJECTION, SOLUTION INTRAVENOUS at 21:20

## 2024-01-14 RX ADMIN — Medication 1 MCG: at 11:49

## 2024-01-14 RX ADMIN — Medication 100 MEQ: at 17:40

## 2024-01-14 RX ADMIN — IOPAMIDOL 80 ML: 755 INJECTION, SOLUTION INTRAVENOUS at 13:16

## 2024-01-14 RX ADMIN — AZITHROMYCIN MONOHYDRATE 500 MG: 500 INJECTION, POWDER, LYOPHILIZED, FOR SOLUTION INTRAVENOUS at 18:00

## 2024-01-14 RX ADMIN — PHENYLEPHRINE HYDROCHLORIDE 50 MCG/MIN: 10 INJECTION INTRAVENOUS at 17:37

## 2024-01-14 RX ADMIN — CALCIUM GLUCONATE 2000 MG: 20 INJECTION, SOLUTION INTRAVENOUS at 23:08

## 2024-01-14 RX ADMIN — Medication: at 23:36

## 2024-01-14 RX ADMIN — Medication 10 MCG/MIN: at 11:20

## 2024-01-14 ASSESSMENT — PULMONARY FUNCTION TESTS
PIF_VALUE: 31
PIF_VALUE: 29
PIF_VALUE: 25
PIF_VALUE: 31

## 2024-01-14 NOTE — PROCEDURES
Sarwat Chavez is a 33 y.o. male patient.  1. Cardiac arrest (HCC)      Past Medical History:   Diagnosis Date    Alcohol abuse 5/3/2022     Blood pressure (!) 116/27, pulse (!) 108, temperature (!) 95.7 °F (35.4 °C), resp. rate 16, SpO2 100 %.    Insert Arterial Line    Date/Time: 1/14/2024 2:43 PM    Performed by: Andrew Stone DO  Authorized by: Andrew Stone DO  Consent: The procedure was performed in an emergent situation. Verbal consent not obtained. Written consent not obtained.  Patient understanding: patient does not state understanding of the procedure being performed  Patient identity confirmed: arm band  Time out: Immediately prior to procedure a \"time out\" was called to verify the correct patient, procedure, equipment, support staff and site/side marked as required.  Preparation: Patient was prepped and draped in the usual sterile fashion.  Indications: multiple ABGs and hemodynamic monitoring  Location: left radial  Anesthesia: local infiltration    Anesthesia:  Local Anesthetic: lidocaine 1% with epinephrine    Sedation:  Patient sedated: no    Number of attempts: 2  Post-procedure: line sutured and dressing applied  Post-procedure CMS: normal and unchanged  Patient tolerance: patient tolerated the procedure well with no immediate complications        Andrew Stone DO  1/14/2024    Addendum by Dr. Geraldine MD:  Dr.Aubrey Bertha DO  performed the above described procedure under my supervision.  The procedure was performed with out any immediate complications.    Nikita Chandler MD 1/14/2024 2:59 PM

## 2024-01-14 NOTE — RT PROTOCOL NOTE
Assessment PRN for score 0-3 or on second treatment, BID, and PRN for scores above 3.    No Indications - adjust the frequency to every 6 hours PRN wheezing or bronchospasm, if no treatments needed after 48 hours then discontinue using Per Protocol order mode.     If indication present, adjust the RT bronchodilator orders based on the Bronchodilator Assessment Score as indicated below.  Use Inhaler orders unless patient has one or more of the following: on home nebulizer, not able to hold breath for 10 seconds, is not alert and oriented, cannot activate and use MDI correctly, or respiratory rate 25 breaths per minute or more, then use the equivalent nebulizer order(s) with same Frequency and PRN reasons based on the score.  If a patient is on this medication at home then do not decrease Frequency below that used at home.    0-3 - enter or revise RT bronchodilator order(s) to equivalent RT Bronchodilator order with Frequency of every 4 hours PRN for wheezing or increased work of breathing using Per Protocol order mode.        4-6 - enter or revise RT Bronchodilator order(s) to two equivalent RT bronchodilator orders with one order with BID Frequency and one order with Frequency of every 4 hours PRN wheezing or increased work of breathing using Per Protocol order mode.        7-10 - enter or revise RT Bronchodilator order(s) to two equivalent RT bronchodilator orders with one order with TID Frequency and one order with Frequency of every 4 hours PRN wheezing or increased work of breathing using Per Protocol order mode.       11-13 - enter or revise RT Bronchodilator order(s) to one equivalent RT bronchodilator order with QID Frequency and an Albuterol order with Frequency of every 4 hours PRN wheezing or increased work of breathing using Per Protocol order mode.      Greater than 13 - enter or revise RT Bronchodilator order(s) to one equivalent RT bronchodilator order with every 4 hours Frequency and an Albuterol

## 2024-01-14 NOTE — H&P
CRITICAL CARE PROGRESS NOTE      Patient:  Sarwat Pintoton    Unit/Bed:4D-14/014-A  YOB: 1990  MRN: [id number]   PCP: Colin Min DO  Date of Admission: 1/14/2024  Chief Complaint:-Found down    Assessment and Plan:    Cardiac Arrest: Unknown etiology.  No family history of early cardiac death.  Patient does have known history of hypertension and is noncompliant with medications.  12/30 patient presented to the ED with complaint of chest pain and was found to have elevated blood pressure.  Patient left AMA and did not follow-up.  Patient initially presented in asystole per EMS.  After several rounds of epi patient went into V-fib and was shocked with 200 J.  ROSC achieved after 60 minutes of ACLS.  Patient is a known extensive alcohol abuser.  UDS positive for cannabinoids.  Cardiology not recommending LHC at this time.  Patient is high risk for repeat cardiac arrest.  Echo pending  EEG pending  Telemetry monitoring  Cardiology following  Acute Hypoxic Hypercapnic Respiratory Failure: Patient intubated by EMS.  ABG demonstrating hypercapnia with pCO2 85, pO2 95.  Patient's fiancé states that he began having a cough yesterday.  BC pending, urine culture pending.  Patient started on Rocephin and azithromycin.  Pneumonia panel pending.  Respiratory culture pending.  CXR demonstrates no acute findings.  ABG every 4 hours  Wean vent settings as tolerated  Undifferentiated Shock:  Likely cardiogenic shock s/p cardiac arrest.  Patient on epi, Levophed, adama, vasopressin.  Currently in place.  Maps in the 50s.  Bicarb 150 cc/h.  Extremities cool to the touch.   Severe Lactic Acidosis: Lactic acid 20.9.  Patient on bicarb drip 150 cc an hour.  S/p several bicarb pushes.  Lactic acid every 2 hours.  BMP every 6 hours.  Continue bicarb drip.  CRRT initiated on 1/14.  Ischemic ATN: 2/2 cardiac arrest creat 3.0.  Minimal urine output.  HD catheter placed in left IJ.  Potassium 5.9.  Bicarb 150 cc an hour.  BMP

## 2024-01-14 NOTE — CODE DOCUMENTATION
Pt to ED in cardiac arrest via [place]. Pt fiance witnessed pt fall down at roughly 1030. EMS received a call at 1040. Pt received 2 epi and 1 narcan en route. Pt arrives with CPR in progress. Pt in asystole the entirety of route to ED per EMS. Pt glucose 104 per EMS. Dr. Artis, Dr. Cao and RT at bedside. Monitors applied.

## 2024-01-14 NOTE — PROCEDURES
Sarwat Chavez is a 33 y.o. male patient.  1. Cardiac arrest (HCC)    2. Alcohol abuse      Past Medical History:   Diagnosis Date    Alcohol abuse 5/3/2022    ATN (acute tubular necrosis) (HCC) 1/14/2024     Blood pressure (!) 134/15, pulse (!) 102, temperature (!) 95.7 °F (35.4 °C), resp. rate (!) 31, weight 69.2 kg (152 lb 8.9 oz), SpO2 100 %.    Hemodialysis Catheter    Date/Time: 1/14/2024 4:41 PM    Performed by: Andrew Stone DO  Authorized by: Andrew Stone DO  Consent: Verbal consent obtained.  Risks and benefits: risks, benefits and alternatives were discussed  Consent given by: spouse  Procedure consent: procedure consent matches procedure scheduled  Relevant documents: relevant documents present and verified  Patient identity confirmed: arm band  Time out: Immediately prior to procedure a \"time out\" was called to verify the correct patient, procedure, equipment, support staff and site/side marked as required.  Indications: vascular access  Anesthesia: local infiltration    Anesthesia:  Local Anesthetic: lidocaine 1% with epinephrine    Sedation:  Patient sedated: no    Skin prep agent dried: skin prep agent completely dried prior to procedure  Hand hygiene: hand hygiene performed prior to central venous catheter insertion  Location details: left internal jugular  Patient position: Trendelenburg  Catheter type: double lumen  Pre-procedure: landmarks identified  Ultrasound guidance: yes  Number of attempts: 2  Successful placement: yes  Post-procedure: line sutured and dressing applied  Assessment: blood return through all ports, free fluid flow and placement verified by x-ray  Patient tolerance: patient tolerated the procedure well with no immediate complications        Andrew Stone DO  1/14/2024    Addendum by Dr. Geraldine MD:  I was present at the bed side during the above described procedure.  Dr.Aubrey Bertha DO performed the above described procedure under my direct supervision.  The procedure

## 2024-01-14 NOTE — PLAN OF CARE
Problem: Respiratory - Adult  Goal: Achieves optimal ventilation and oxygenation  Outcome: Not Progressing  Flowsheets (Taken 1/14/2024 1432)  Achieves optimal ventilation and oxygenation:   Assess for changes in respiratory status   Assess the need for suctioning and aspirate as needed   Assess and instruct to report shortness of breath or any respiratory difficulty

## 2024-01-14 NOTE — CONSULTS
allergies.    24HR INTAKE/OUTPUT:    Intake/Output Summary (Last 24 hours) at 1/14/2024 1606  Last data filed at 1/14/2024 1215  Gross per 24 hour   Intake --   Output 400 ml   Net -400 ml     No intake/output data recorded.  I/O this shift:  In: -   Out: 400 [Emesis/NG output:400]  Admission weight:    Wt Readings from Last 3 Encounters:   12/30/23 68 kg (150 lb)   10/31/23 74.8 kg (165 lb)   07/10/23 74.7 kg (164 lb 9.6 oz)     There is no height or weight on file to calculate BMI.    Physical Examination:  VITALS:  BP 93/60   Pulse (!) 103   Temp (!) 95.7 °F (35.4 °C)   Resp 18   SpO2 100%   Weight:   Wt Readings from Last 3 Encounters:   12/30/23 68 kg (150 lb)   10/31/23 74.8 kg (165 lb)   07/10/23 74.7 kg (164 lb 9.6 oz)     Constitutional and General Appearance: unresponsive  Eyes: pupils fixed  Ears and Nose: normal external appearance of left and right ear and nose.  No active drainage from nose.   Oral: moist oral mucus membranes  Neck: + ET tube  Lungs: on mechanical ventilation  Heart: regular rate, S1, S2  Extremities: no LE edema  GI: soft, non-tender, no guarding  Skin: no rash seen on exposed extremities  Neuro: unresponsive      Lab Data  CBC:   Recent Labs     01/14/24  1117 01/14/24  1354   WBC 25.9* 26.4*   HGB 10.0* 10.9*   HCT 32.4* 34.6*    149     BMP:  Recent Labs     01/14/24  1117 01/14/24  1126 01/14/24  1354    140 135   K 5.2 4.5 5.9*   CL 92*  --  90*   CO2 12*  --  13*   BUN 17  --  17   CREATININE 2.6*  --  3.0*   GLUCOSE 28*  --  30*   CALCIUM 9.1  --  7.6*   MG 2.3  --   --      PTH: @PTH@  TSH:   Recent Labs     01/14/24  1354   TSH 9.390*     HgBa1c: No results for input(s): \"LABA1C\" in the last 72 hours.  Hepatic:   Recent Labs     01/14/24  1117 01/14/24  1354   LABALBU 3.0* 3.0*   * 1,068*   ALT 56 223*   BILITOT 6.0* 6.8*   ALKPHOS 94 225*     ABGs: No results found for: \"PHART\", \"PO2ART\", \"KAR3PBI\"  Troponin: No results for input(s): \"TROPONINI\" in 
in inferolateral leads. Interventional cardiology was consulted  His glucose was low at 23, corrected  Labs revealed pH of 6.79, lactic acid>20, WBC count 25,900, Hgb 10  CXR revealed no acute cardiopulmonary findings  Differential diagnoses for the etiology for cardiac arrest includes metabolic in setting of suspected electrolyte abnormalities Vs a primary ischemic event  Also, \"Body shaking\" was reported, ? Seizure   No chest pain reported prior to onset of symptoms per his family    He is requiring escalating dose of vasopressors and is not clinically stable for invasive procedure at this time  Further work up, other labs and head CT scan are in progress per ER team   Unfortunately, the patient has had a prolonged resuscitation in setting of cardiac arrest for about one hour since time of onset of symptoms. The patient continued to be unresponsive with fixed dilated pupils. Per ER team, the patient has been unresponsive since arrival to ER, does not react to stimuli, does no breath above the ventilator. There is concern for significant brain anoxic injury due to hypoperfusion in setting of out of the hospital cardiac arrest with prolonged resuscitation. He is not a candidate of left cardiac catheterization pending more assessment of neurological status. Empiric medical treatment for possible MI as tolerated. May consider heparin gtt if no ICH is noted on head CT scan. Supportive care. Prognosis is poor     Thank you for allowing me to participate in the care of this patient.  Please let me know if I can be of any further assistance.      Hussain Bentley MD, [facility], [facility]   12:02 PM  1/14/2024

## 2024-01-14 NOTE — ED PROVIDER NOTES
Patient was transported to the emergency department continuing CPR and ACLS protocol.  Patient received 2 epi and 1 Narcan prior to arrival.  GCS of 3.  Pupils fixed upon arrival.  ACLS protocol continued, patient received additional epinephrine, after third epi patient appeared to be in V-fib.  Patient was defibrillated once at 200 J with ROSC.  Patient was also successfully intubated, Sandeep airway replaced with endotracheal tube by Dr. Cao.  EKG obtained after ROSC concerning for STEMI therefore STEMI alert was paged.  Patient was given a total of 3 amps of bicarb, pH was 6.77 on bedside ABG with metabolic panel.  Lactate was greater than 20.  Patient was given IV fluid bolus, also started on Levophed after blood pressure began to trend downward shortly after ROSC was obtained.  Dr. Stanton came to see patient emergently in the emergency department.  Patient's history obtained from shine who had arrived in the emergency department.  Patient had been at brother's house the night before.  He does drink daily.  He had complained of feeling ill, had had some vomiting.  She states that he had collapsed once at home and she helped him up.  She made him some soup.  Later he was calling out for her and collapsed a second time.  She called 911.  Dr. Stanton reviewed patient's history, EKG, and bedside cardiac POCUS.  At the time of Dr. Stanton's evaluation, patient's time since arrest is nearly an hour.  GCS remains 3.  Have continued to escalate doses of Levophed at bedside with additional push dose epinephrine given, blood pressure is continuing to remain in the 60s over 40s.  Does not feel that patient is candidate for Cath Lab intervention at this time.  Dr. Romero had discussion with patient's significant other and those present.  Dr. Portillo updated patient's brothers and also spoke with patient's mother on the phone.  Mother lives out of state in [place] and is attempting to travel to [place], she request that we 
  phenylephrine (MARYCARMEN-SYNEPHRINE) 50 mg in sodium chloride 0.9 % 250 mL infusion ( IntraVENous Canceled Entry 1/14/24 1441)   cefTRIAXone (ROCEPHIN) 1,000 mg in sodium chloride 0.9 % 50 mL IVPB (mini-bag) (has no administration in time range)   thiamine (B-1) injection 100 mg (has no administration in time range)   folic acid injection 1 mg (has no administration in time range)   cyanocobalamin injection 1,000 mcg (has no administration in time range)   midazolam (VERSED) 100mg/100mL in NS infusion (has no administration in time range)   fentaNYL (SUBLIMAZE) 1,000 mcg in sodium chloride 0.9% 100 mL infusion (has no administration in time range)   EPINEPHrine 1 MG/10ML injection (1 mcg IntraVENous Given 1/14/24 1149)   sodium bicarbonate 8.4 % injection (50 mEq IntraVENous Given 1/14/24 1140)   magnesium sulfate injection (1,000 mg IntraVENous Given 1/14/24 1112)   norepinephrine-sodium chloride (LEVOPHED) 16-0.9 MG/250ML-% infusion (70 mcg/min  Rate/Dose Change 1/14/24 1149)   iopamidol (ISOVUE-370) 76 % injection 80 mL (80 mLs IntraVENous Given 1/14/24 1316)       PROCEDURES: (None if blank)  Central Line    Date/Time: 1/14/2024 10:47 PM    Performed by: Nash Cao MD  Authorized by: Carie Artis MD    Consent:     Consent obtained:  Emergent situation  Universal protocol:     Imaging studies available: yes      Patient identity confirmed:  Hospital-assigned identification number and arm band  Pre-procedure details:     Indication(s): central venous access and insufficient peripheral access      Hand hygiene: Hand hygiene performed prior to insertion      Skin preparation:  Chlorhexidine    Skin preparation agent: Skin preparation agent completely dried prior to procedure    Sedation:     Sedation type:  None  Anesthesia:     Anesthesia method:  None  Procedure details:     Location:  R internal jugular    Patient position:  Reverse Trendelenburg    Procedural supplies:  Triple lumen    Catheter size:  7

## 2024-01-15 VITALS
HEART RATE: 62 BPM | OXYGEN SATURATION: 97 % | DIASTOLIC BLOOD PRESSURE: 64 MMHG | TEMPERATURE: 97.3 F | WEIGHT: 152.56 LBS | SYSTOLIC BLOOD PRESSURE: 82 MMHG | BODY MASS INDEX: 21.89 KG/M2

## 2024-01-15 LAB
ACB COMPLEX DNA BLD POS QL NAA+NON-PROBE: NOT DETECTED
ALBUMIN SERPL BCG-MCNC: 2.6 G/DL (ref 3.5–5.1)
ALP SERPL-CCNC: 222 U/L (ref 38–126)
ALT SERPL W/O P-5'-P-CCNC: 1615 U/L (ref 11–66)
ANION GAP SERPL CALC-SCNC: 29 MEQ/L (ref 8–16)
ANISOCYTOSIS BLD QL SMEAR: PRESENT
ARTERIAL PATENCY WRIST A: ABNORMAL
ARTERIAL PATENCY WRIST A: ABNORMAL
AST SERPL-CCNC: < 5 U/L (ref 5–40)
B FRAGILIS DNA BLD POS QL NAA+NON-PROBE: NOT DETECTED
BASE EXCESS BLDA CALC-SCNC: -16.9 MMOL/L (ref -2.5–2.5)
BASE EXCESS BLDA CALC-SCNC: -4.7 MMOL/L (ref -2.5–2.5)
BASE EXCESS BLDA CALC-SCNC: -7.7 MMOL/L (ref -2.5–2.5)
BASOPHILS ABSOLUTE: 0.1 THOU/MM3 (ref 0–0.1)
BASOPHILS NFR BLD AUTO: 0.2 %
BDY SITE: ABNORMAL
BDY SITE: ABNORMAL
BILIRUB CONJ SERPL-MCNC: 4.4 MG/DL (ref 0–0.3)
BILIRUB SERPL-MCNC: 5.6 MG/DL (ref 0.3–1.2)
BLACTX-M ISLT/SPM QL: ABNORMAL
BLAIMP ISLT/SPM QL: ABNORMAL
BLAKPC ISLT/SPM QL: ABNORMAL
BLAOXA-48-LIKE ISLT/SPM QL: ABNORMAL
BLAVIM ISLT/SPM QL: ABNORMAL
BOTTLE TYPE: ABNORMAL
BREATHS SETTING VENT: 22 BPM
BREATHS SETTING VENT: 24 BPM
BUN SERPL-MCNC: 9 MG/DL (ref 7–22)
C ALBICANS DNA BLD POS QL NAA+NON-PROBE: NOT DETECTED
C AURIS DNA BLD POS QL NAA+NON-PROBE: NOT DETECTED
C GATTII+NEOFOR DNA BLD POS QL NAA+N-PRB: NOT DETECTED
C GLABRATA DNA BLD POS QL NAA+NON-PROBE: NOT DETECTED
C KRUSEI DNA BLD POS QL NAA+NON-PROBE: NOT DETECTED
C PARAP DNA BLD POS QL NAA+NON-PROBE: NOT DETECTED
C TROPICLS DNA BLD POS QL NAA+NON-PROBE: NOT DETECTED
CA-I BLD ISE-SCNC: 0.7 MMOL/L (ref 1.12–1.32)
CALCIUM SERPL-MCNC: 5.4 MG/DL (ref 8.5–10.5)
CHLORIDE SERPL-SCNC: 88 MEQ/L (ref 98–111)
CO2 SERPL-SCNC: 20 MEQ/L (ref 23–33)
COAG NEG STAPH DNA BLD QL NAA+PROBE: NOT DETECTED
COLISTIN RES MCR-1 ISLT/SPM QL: ABNORMAL
COLLECTED BY:: ABNORMAL
CREAT SERPL-MCNC: 1.8 MG/DL (ref 0.4–1.2)
DACROCYTES: ABNORMAL
DEPRECATED RDW RBC AUTO: 68.1 FL (ref 35–45)
DEPRECATED RDW RBC AUTO: 68.6 FL (ref 35–45)
DEVICE: ABNORMAL
DEVICE: ABNORMAL
E CLOAC COMP DNA BLD POS NAA+NON-PROBE: NOT DETECTED
E COLI DNA BLD POS QL NAA+NON-PROBE: NOT DETECTED
E FAECALIS DNA BLD POS QL NAA+NON-PROBE: NOT DETECTED
E FAECIUM DNA BLD POS QL NAA+NON-PROBE: NOT DETECTED
ENTEROBACTERALES DNA BLD POS NAA+N-PRB: NOT DETECTED
EOSINOPHIL NFR BLD AUTO: 0 %
EOSINOPHILS ABSOLUTE: 0 THOU/MM3 (ref 0–0.4)
ERYTHROCYTE [DISTWIDTH] IN BLOOD BY AUTOMATED COUNT: 16.8 % (ref 11.5–14.5)
ERYTHROCYTE [DISTWIDTH] IN BLOOD BY AUTOMATED COUNT: 17.7 % (ref 11.5–14.5)
FIO2 ON VENT O2 ANALYZER: 100 %
FIO2 ON VENT O2 ANALYZER: 90 %
GFR SERPL CREATININE-BSD FRML MDRD: 50 ML/MIN/1.73M2
GLUCOSE BLD-MCNC: 168 MG/DL (ref 70–108)
GLUCOSE BLD-MCNC: 428 MG/DL (ref 70–108)
GLUCOSE BLD-MCNC: 493 MG/DL (ref 70–108)
GLUCOSE SERPL-MCNC: 438 MG/DL (ref 70–108)
GP B STREP DNA SPEC QL NAA+PROBE: NOT DETECTED
GP B STREP DNA SPEC QL NAA+PROBE: NOT DETECTED
HAEM INFLU DNA BLD POS QL NAA+NON-PROBE: NOT DETECTED
HCO3 BLDA-SCNC: 16 MMOL/L (ref 23–28)
HCO3 BLDA-SCNC: 22 MMOL/L (ref 23–28)
HCO3 BLDA-SCNC: 25 MMOL/L (ref 23–28)
HCT VFR BLD AUTO: 25.2 % (ref 42–52)
HCT VFR BLD AUTO: 32.4 % (ref 42–52)
HGB BLD-MCNC: 10 GM/DL (ref 14–18)
HGB BLD-MCNC: 8 GM/DL (ref 14–18)
IMM GRANULOCYTES # BLD AUTO: 1.22 THOU/MM3 (ref 0–0.07)
IMM GRANULOCYTES NFR BLD AUTO: 4.7 %
INR PPP: 6.99 (ref 0.85–1.13)
K OXYTOCA DNA BLD POS QL NAA+NON-PROBE: NOT DETECTED
K OXYTOCA DNA BLD POS QL NAA+NON-PROBE: NOT DETECTED
KLEBSIELLA SP DNA BLD POS QL NAA+NON-PRB: NOT DETECTED
L MONOCYTOG DNA BLD POS QL NAA+NON-PROBE: NOT DETECTED
LACTATE SERPL-SCNC: 21 MMOL/L (ref 0.5–2)
LACTATE SERPL-SCNC: 21 MMOL/L (ref 0.5–2)
LACTATE SERPL-SCNC: 21.8 MMOL/L (ref 0.5–2)
LACTATE SERPL-SCNC: 22.9 MMOL/L (ref 0.5–2)
LYMPHOCYTES ABSOLUTE: 2.3 THOU/MM3 (ref 1–4.8)
LYMPHOCYTES NFR BLD AUTO: 8.8 %
MACROCYTES BLD QL SMEAR: PRESENT
MAGNESIUM SERPL-MCNC: 1.8 MG/DL (ref 1.6–2.4)
MCH RBC QN AUTO: 33.9 PG (ref 26–33)
MCH RBC QN AUTO: 35 PG (ref 26–33)
MCHC RBC AUTO-ENTMCNC: 30.9 GM/DL (ref 32.2–35.5)
MCHC RBC AUTO-ENTMCNC: 31.7 GM/DL (ref 32.2–35.5)
MCV RBC AUTO: 106.8 FL (ref 80–94)
MCV RBC AUTO: 113.3 FL (ref 80–94)
MECA ISLT/SPM QL: ABNORMAL
MECA+MECC+MREJ ISLT/SPM QL: ABNORMAL
MONOCYTES ABSOLUTE: 3.3 THOU/MM3 (ref 0.4–1.3)
MONOCYTES NFR BLD AUTO: 12.6 %
N MEN DNA BLD POS QL NAA+NON-PROBE: NOT DETECTED
NDM: ABNORMAL
NEUTROPHILS NFR BLD AUTO: 73.7 %
NRBC BLD AUTO-RTO: 1 /100 WBC
P AERUGINOSA DNA BLD POS NAA+NON-PROBE: NOT DETECTED
PATHOLOGIST REVIEW: ABNORMAL
PCO2 BLDA: 67 MMHG (ref 35–45)
PCO2 BLDA: 73 MMHG (ref 35–45)
PCO2 BLDA: 75 MMHG (ref 35–45)
PEEP SETTING VENT: 10 MMHG
PEEP SETTING VENT: 10 MMHG
PH BLDA: 6.93 [PH] (ref 7.35–7.45)
PH BLDA: 7.12 [PH] (ref 7.35–7.45)
PH BLDA: 7.14 [PH] (ref 7.35–7.45)
PHOSPHATE SERPL-MCNC: 5.1 MG/DL (ref 2.4–4.7)
PIP: 30 CMH2O
PIP: 30 CMH2O
PLATELET # BLD AUTO: 136 THOU/MM3 (ref 130–400)
PLATELET # BLD AUTO: 52 THOU/MM3 (ref 130–400)
PMV BLD AUTO: 10.3 FL (ref 9.4–12.4)
PMV BLD AUTO: 10.9 FL (ref 9.4–12.4)
PO2 BLDA: 121 MMHG (ref 71–104)
PO2 BLDA: 238 MMHG (ref 71–104)
PO2 BLDA: 76 MMHG (ref 71–104)
POIKILOCYTES: ABNORMAL
POLYCHROMASIA BLD QL SMEAR: ABNORMAL
POTASSIUM SERPL-SCNC: 3.5 MEQ/L (ref 3.5–5.2)
PROT SERPL-MCNC: 3.8 G/DL (ref 6.1–8)
PROTEUS SPP: NOT DETECTED
RBC # BLD AUTO: 2.36 MILL/MM3 (ref 4.7–6.1)
RBC # BLD AUTO: 2.86 MILL/MM3 (ref 4.7–6.1)
S AUREUS DNA BLD POS QL NAA+NON-PROBE: NOT DETECTED
S EPIDERMIDIS DNA BLD POS QL NAA+NON-PRB: NOT DETECTED
S LUGDUNENSIS DNA BLD POS QL NAA+NON-PRB: NOT DETECTED
S MALTOPHILIA DNA BLD POS QL NAA+NON-PRB: NOT DETECTED
S MARCESCENS DNA BLD POS NAA+NON-PROBE: NOT DETECTED
S PYO DNA THROAT QL NAA+PROBE: DETECTED
SALMONELLA DNA BLD POS QL NAA+NON-PROBE: NOT DETECTED
SAO2 % BLDA: 89 %
SAO2 % BLDA: 97 %
SAO2 % BLDA: 99 %
SEGMENTED NEUTROPHILS ABSOLUTE COUNT: 19.1 THOU/MM3 (ref 1.8–7.7)
SODIUM SERPL-SCNC: 137 MEQ/L (ref 135–145)
SOURCE OF BLOOD CULTURE: ABNORMAL
STREPTOCOCCUS DNA BLD QL NAA+PROBE: DETECTED
TARGETS BLD QL SMEAR: ABNORMAL
VANA+VANB ISLT/SPM QL: ABNORMAL
VENTILATION MODE VENT: ABNORMAL
VENTILATION MODE VENT: ABNORMAL
WBC # BLD AUTO: 25.9 THOU/MM3 (ref 4.8–10.8)
WBC # BLD AUTO: 9.3 THOU/MM3 (ref 4.8–10.8)

## 2024-01-15 PROCEDURE — 99238 HOSP IP/OBS DSCHRG MGMT 30/<: CPT | Performed by: INTERNAL MEDICINE

## 2024-01-15 PROCEDURE — 80053 COMPREHEN METABOLIC PANEL: CPT

## 2024-01-15 PROCEDURE — 2580000003 HC RX 258: Performed by: INTERNAL MEDICINE

## 2024-01-15 PROCEDURE — 2580000003 HC RX 258: Performed by: STUDENT IN AN ORGANIZED HEALTH CARE EDUCATION/TRAINING PROGRAM

## 2024-01-15 PROCEDURE — 37799 UNLISTED PX VASCULAR SURGERY: CPT

## 2024-01-15 PROCEDURE — 2500000003 HC RX 250 WO HCPCS: Performed by: NURSE PRACTITIONER

## 2024-01-15 PROCEDURE — 83735 ASSAY OF MAGNESIUM: CPT

## 2024-01-15 PROCEDURE — 6360000002 HC RX W HCPCS: Performed by: NURSE PRACTITIONER

## 2024-01-15 PROCEDURE — 93010 ELECTROCARDIOGRAM REPORT: CPT | Performed by: INTERNAL MEDICINE

## 2024-01-15 PROCEDURE — 6360000002 HC RX W HCPCS: Performed by: STUDENT IN AN ORGANIZED HEALTH CARE EDUCATION/TRAINING PROGRAM

## 2024-01-15 PROCEDURE — 94761 N-INVAS EAR/PLS OXIMETRY MLT: CPT

## 2024-01-15 PROCEDURE — 83605 ASSAY OF LACTIC ACID: CPT

## 2024-01-15 PROCEDURE — 94003 VENT MGMT INPAT SUBQ DAY: CPT

## 2024-01-15 PROCEDURE — 6360000002 HC RX W HCPCS: Performed by: INTERNAL MEDICINE

## 2024-01-15 PROCEDURE — P9047 ALBUMIN (HUMAN), 25%, 50ML: HCPCS | Performed by: STUDENT IN AN ORGANIZED HEALTH CARE EDUCATION/TRAINING PROGRAM

## 2024-01-15 PROCEDURE — 82330 ASSAY OF CALCIUM: CPT

## 2024-01-15 PROCEDURE — 85610 PROTHROMBIN TIME: CPT

## 2024-01-15 PROCEDURE — 82947 ASSAY GLUCOSE BLOOD QUANT: CPT

## 2024-01-15 PROCEDURE — 2580000003 HC RX 258: Performed by: NURSE PRACTITIONER

## 2024-01-15 PROCEDURE — 6360000002 HC RX W HCPCS: Performed by: EMERGENCY MEDICINE

## 2024-01-15 PROCEDURE — 36430 TRANSFUSION BLD/BLD COMPNT: CPT

## 2024-01-15 PROCEDURE — 90945 DIALYSIS ONE EVALUATION: CPT | Performed by: INTERNAL MEDICINE

## 2024-01-15 PROCEDURE — 85027 COMPLETE CBC AUTOMATED: CPT

## 2024-01-15 PROCEDURE — 84100 ASSAY OF PHOSPHORUS: CPT

## 2024-01-15 PROCEDURE — 2700000000 HC OXYGEN THERAPY PER DAY

## 2024-01-15 PROCEDURE — 2500000003 HC RX 250 WO HCPCS: Performed by: EMERGENCY MEDICINE

## 2024-01-15 PROCEDURE — 2580000003 HC RX 258: Performed by: EMERGENCY MEDICINE

## 2024-01-15 PROCEDURE — 6370000000 HC RX 637 (ALT 250 FOR IP): Performed by: STUDENT IN AN ORGANIZED HEALTH CARE EDUCATION/TRAINING PROGRAM

## 2024-01-15 PROCEDURE — 82803 BLOOD GASES ANY COMBINATION: CPT

## 2024-01-15 PROCEDURE — 82248 BILIRUBIN DIRECT: CPT

## 2024-01-15 PROCEDURE — 2500000003 HC RX 250 WO HCPCS

## 2024-01-15 PROCEDURE — 2580000003 HC RX 258

## 2024-01-15 PROCEDURE — 36415 COLL VENOUS BLD VENIPUNCTURE: CPT

## 2024-01-15 PROCEDURE — 2500000003 HC RX 250 WO HCPCS: Performed by: STUDENT IN AN ORGANIZED HEALTH CARE EDUCATION/TRAINING PROGRAM

## 2024-01-15 RX ORDER — INSULIN LISPRO 100 [IU]/ML
0-4 INJECTION, SOLUTION INTRAVENOUS; SUBCUTANEOUS NIGHTLY
Status: DISCONTINUED | OUTPATIENT
Start: 2024-01-15 | End: 2024-01-15 | Stop reason: SDUPTHER

## 2024-01-15 RX ORDER — IBUPROFEN 600 MG/1
1 TABLET ORAL PRN
Status: DISCONTINUED | OUTPATIENT
Start: 2024-01-15 | End: 2024-01-15 | Stop reason: HOSPADM

## 2024-01-15 RX ORDER — CALCIUM CHLORIDE 100 MG/ML
INJECTION INTRAVENOUS; INTRAVENTRICULAR
Status: COMPLETED
Start: 2024-01-15 | End: 2024-01-15

## 2024-01-15 RX ORDER — CALCIUM CHLORIDE 100 MG/ML
1000 INJECTION INTRAVENOUS; INTRAVENTRICULAR ONCE
Status: COMPLETED | OUTPATIENT
Start: 2024-01-15 | End: 2024-01-15

## 2024-01-15 RX ORDER — SODIUM CHLORIDE 9 MG/ML
INJECTION, SOLUTION INTRAVENOUS CONTINUOUS
Status: DISCONTINUED | OUTPATIENT
Start: 2024-01-15 | End: 2024-01-15

## 2024-01-15 RX ORDER — DEXTROSE MONOHYDRATE 100 MG/ML
INJECTION, SOLUTION INTRAVENOUS CONTINUOUS PRN
Status: DISCONTINUED | OUTPATIENT
Start: 2024-01-15 | End: 2024-01-15 | Stop reason: HOSPADM

## 2024-01-15 RX ORDER — 0.9 % SODIUM CHLORIDE 0.9 %
1000 INTRAVENOUS SOLUTION INTRAVENOUS ONCE
Status: COMPLETED | OUTPATIENT
Start: 2024-01-15 | End: 2024-01-15

## 2024-01-15 RX ORDER — INSULIN LISPRO 100 [IU]/ML
0-4 INJECTION, SOLUTION INTRAVENOUS; SUBCUTANEOUS EVERY 4 HOURS
Status: DISCONTINUED | OUTPATIENT
Start: 2024-01-15 | End: 2024-01-15 | Stop reason: HOSPADM

## 2024-01-15 RX ADMIN — SODIUM BICARBONATE 150 MEQ: 84 INJECTION, SOLUTION INTRAVENOUS at 00:36

## 2024-01-15 RX ADMIN — SODIUM BICARBONATE: 84 INJECTION, SOLUTION INTRAVENOUS at 05:52

## 2024-01-15 RX ADMIN — CALCIUM GLUCONATE 2000 MG: 20 INJECTION, SOLUTION INTRAVENOUS at 05:47

## 2024-01-15 RX ADMIN — PHENYLEPHRINE HYDROCHLORIDE 300 MCG/MIN: 10 INJECTION INTRAVENOUS at 06:04

## 2024-01-15 RX ADMIN — SODIUM CHLORIDE 1000 ML: 9 INJECTION, SOLUTION INTRAVENOUS at 02:50

## 2024-01-15 RX ADMIN — SODIUM CHLORIDE: 9 INJECTION, SOLUTION INTRAVENOUS at 05:41

## 2024-01-15 RX ADMIN — SODIUM BICARBONATE: 84 INJECTION, SOLUTION INTRAVENOUS at 02:05

## 2024-01-15 RX ADMIN — VASOPRESSIN 0.04 UNITS/MIN: 20 INJECTION INTRAVENOUS at 04:10

## 2024-01-15 RX ADMIN — Medication: at 04:38

## 2024-01-15 RX ADMIN — CALCIUM GLUCONATE: 98 INJECTION, SOLUTION INTRAVENOUS at 07:30

## 2024-01-15 RX ADMIN — LACTULOSE 20 G: 20 SOLUTION ORAL at 01:55

## 2024-01-15 RX ADMIN — SODIUM BICARBONATE: 84 INJECTION, SOLUTION INTRAVENOUS at 04:33

## 2024-01-15 RX ADMIN — ALBUMIN (HUMAN) 12.5 G: 0.25 INJECTION, SOLUTION INTRAVENOUS at 06:20

## 2024-01-15 RX ADMIN — Medication 150 MEQ: at 00:36

## 2024-01-15 RX ADMIN — SODIUM BICARBONATE: 84 INJECTION, SOLUTION INTRAVENOUS at 01:02

## 2024-01-15 RX ADMIN — SODIUM BICARBONATE: 84 INJECTION, SOLUTION INTRAVENOUS at 03:19

## 2024-01-15 RX ADMIN — CALCIUM CHLORIDE INJECTION 1000 MG: 100 INJECTION, SOLUTION INTRAVENOUS at 05:16

## 2024-01-15 RX ADMIN — CALCIUM CHLORIDE 1000 MG: 100 INJECTION INTRAVENOUS; INTRAVENTRICULAR at 05:16

## 2024-01-15 RX ADMIN — ALBUMIN (HUMAN) 12.5 G: 0.25 INJECTION, SOLUTION INTRAVENOUS at 01:39

## 2024-01-15 RX ADMIN — Medication: at 04:36

## 2024-01-15 RX ADMIN — SODIUM CHLORIDE 100 MCG/MIN: 9 INJECTION, SOLUTION INTRAVENOUS at 02:51

## 2024-01-15 RX ADMIN — EPINEPHRINE 30 MCG/MIN: 1 INJECTION INTRAMUSCULAR; INTRAVENOUS; SUBCUTANEOUS at 07:19

## 2024-01-15 RX ADMIN — SODIUM BICARBONATE: 84 INJECTION, SOLUTION INTRAVENOUS at 07:06

## 2024-01-15 RX ADMIN — POTASSIUM CHLORIDE 20 MEQ: 29.8 INJECTION, SOLUTION INTRAVENOUS at 05:56

## 2024-01-15 RX ADMIN — PHENYLEPHRINE HYDROCHLORIDE 300 MCG/MIN: 10 INJECTION INTRAVENOUS at 02:52

## 2024-01-15 RX ADMIN — Medication: at 04:44

## 2024-01-15 RX ADMIN — EPINEPHRINE 30 MCG/MIN: 1 INJECTION INTRAMUSCULAR; INTRAVENOUS; SUBCUTANEOUS at 01:37

## 2024-01-15 RX ADMIN — POTASSIUM CHLORIDE 20 MEQ: 29.8 INJECTION, SOLUTION INTRAVENOUS at 06:26

## 2024-01-15 ASSESSMENT — PULMONARY FUNCTION TESTS
PIF_VALUE: 31
PIF_VALUE: 31

## 2024-01-15 NOTE — SIGNIFICANT EVENT
Patient's mother expresses desires to change CODE STATUS to DNR CCA; she would like to wait until the morning for changing to DNR CC.  CODE STATUS updated

## 2024-01-15 NOTE — SIGNIFICANT EVENT
32-year-old patient postcardiac arrest prolonged, multiorgan failure, on CRRT, ventilator, multiple pressor support including vasopressin and epinephrine norepinephrine phenylephrine ABGs refractory metabolic acidosis, on bicarb drip, hemoglobin 7.71 PRBC were ordered for optimizing volume as well has IVF isotonic, patient remains with no sedation with no reflexes or spontaneous breathing, bilateral 6 pupils, mottled skin, poor prognosis, family was updated about patient's conditions, and the possibility of getting cardiac arrest during the next hour; mother who is his proxy states would like to wait until his dad arrives coming from [place].  While that she wants to keep him full code.

## 2024-01-15 NOTE — PROGRESS NOTES
present in ED.   Patient arrived as Code Blue from [place].    Present in ED3 is patient's argentina Rey. She is tearfully and appropriate.   Several family members gathering including patient's siblings. All are pleasant and appropriate.   Patient's mother ( next of kin) lives in [place]. She is trying to find a way to [place]. Chart updated with her contact information.     Patient's father contacted by patient's mother, according to family.     Apparently, patient's stepfather is coming from [place] and there may be a possibility of family dynamics arising according to informaiton provided to Eulogio GREER ( ED Charge). [place] Police, ICU staff and day shift  notified.     Family has been told by Dr. Stone that patient's condition is very critical and hs prognosis is poor.      escorted family to ICU waiting are and oriented them to the space.   
1735 - CRRT alarming access extremely negative.Unable to clear alarm. CRRT stopped without blood return.    1740- Dr. Huertas and Dr. Stone at bedside speaking to the family.        
1835 CRRT restarted.  
1921 updated Dr. Smith on patient condition and vitals. Verbal orders received to give 2 amps bicarb and albumin 25% 25g     1937 Dr. Smith at bedside. Sodium bicarb gtt increased to 250ml/hr     2108 Dr. Smith updated on most recent ABGs     0056 3 amps bicarb push given, arterial BP 45/35 map 38 Dr. Smith and Kelli HUMPHRIES at bedside. Bicarb gtt increased to 999ml/hr per Dr. Smith.    0115 family updated by Dr. Smith at bedside. All questions answered at this time. No new orders    0505 ical 0.70 notified Dr. Smith. 1gm calcium chloride IVP ordered                          
1952 - patient's family asked for the  as they are ready to change patient to a comfort care.    1957 - LOOP updated with plan of care. Waiting to hear back at this time.     0800 - Dr. Stone at bedside for assessment. Informed her that family is ready to pursue comfort care    0808 - LOOP called back and asks that we call with cardiac time of death.     0819 - Family at bedside. Dr. Stone at bedside for comfort care orders. Patient extubated, CRRT stopped, and all drips stopped.     0830 - No pulsatility noted on arterial line. Absence of spontaneous breathing and heart tones. Confirmed by Dr. Stone.    0845 - LOOP notified of cardiac time of death. Following for possible tissue and cornea donation  
Checked on family in waiting room. Patient's mother has arrived from [place]. Brought her warm blankets and offered support.   
[facility]   PROGRESS NOTE      Patient: Sarwat Chavez  Room #: 4D-14/014-A            YOB: 1990  Age: 33 y.o.  Gender: male            Admit Date & Time: 1/14/2024 11:03 AM    Assessment:  Sarwat is being cared for on the ICU for a cardiac arrest.  He is intubated and not awake.  Sarwat has several family members in the waiting area and family are taking turns visiting in the room.  I was told that Sarwat's mom is on her way to [place] from [place], [place].    Interventions:  I met with family members in the room and said a prayer with them for Sarwat and for Sarwat's mother who is traveling today.  I also met with Sarwat's sister Nancy in the waiting area and offered continued  support.    Outcomes:  Family knows that  support is available and that I will check back with them later in the day.    Plan:    Continue to meet with family and support them through prayer and presence.    Electronically signed by Chaplain YOSEPH, on 1/14/2024 at 3:21 PM.  Spiritual Care Department  [facility]  [phone]   
[facility]   Nephrology progress note  1/15/2024, 8:13 AM      Pt Name:    Sarwat Chavez  MRN:     [id number]     YOB: 1990  Admit Date:    1/14/2024 11:03 AM    Chief Complaint: Nephrology following for JOSE/ATN.    Subjective:  Patient was seen and examined this morning on CRRT.   On multiple pressors lactic acid is 20. Tolerating CRRT zero UF but remains significantly acidotic ,waiting for father to come in and family will likely change to DNR-CC.  Objective:  24HR INTAKE/OUTPUT:    Intake/Output Summary (Last 24 hours) at 1/15/2024 0813  Last data filed at 1/15/2024 0800  Gross per 24 hour   Intake 78547.98 ml   Output 1120 ml   Net 46598.98 ml         I/O last 3 completed shifts:  In: 73379.8 [I.V.:28335.1; Blood:400; IV Piggyback:3081.7]  Out: 1120 [Urine:20; Emesis/NG output:500; Stool:600]  I/O this shift:  In: 2524.2 [I.V.:2315.6; IV Piggyback:208.6]  Out: 0    Admission weight: 69.2 kg (152 lb 8.9 oz)  Wt Readings from Last 3 Encounters:   01/14/24 69.2 kg (152 lb 8.9 oz)   12/30/23 68 kg (150 lb)   10/31/23 74.8 kg (165 lb)        Vitals :   Vitals:    01/15/24 0630 01/15/24 0645 01/15/24 0700 01/15/24 0737   BP:       Pulse: (!) 136 (!) 136 (!) 136 (!) 135   Resp: 24 24 24 24   Temp:       TempSrc:       SpO2:       Weight:           Physical examination  General Appearance: unresponsive  Mouth/Throat: Oral mucosa moist  Neck: + ET tube  Lungs: diminished  Heart:  S1, S2 heard tachycardic  GI: soft, non-tender, no guarding  Extremities: mottling of lower extremities    Medications:  Infusion:    EPINEPHrine 10 mg in sodium chloride 0.9 % 250 mL infusion 30 mcg/min (01/15/24 0758)    calcium gluconate 11,000 mg in sodium chloride 0.9 % 1,000 mL infusion 50 mL/hr at 01/15/24 0730    dextrose      vasopressin 20 Units in sodium chloride 0.9 % 100 mL infusion 0.04 Units/min (01/15/24 0758)    sodium bicarbonate 150 mEq in dextrose 5 % 1,000 mL infusion 999 mL/hr at 
[facility]   PROGRESS NOTE      Patient: Sarwat Chavez  Room #: 4D-14/014-A            YOB: 1990  Age: 33 y.o.  Gender: male            Admit Date & Time: 1/14/2024 11:03 AM    Assessment:    The patient was originally encountered in his room where the  provided a silent prayer and evaluated the situation. After checking with the nurse the 's concerns were supported. Upon this the nursing staff encouraged bringing the family back.     Interventions:  The  went to the patient's family and helped them share stories of the patient until all the family was present. They shared the patient was solitary but loved to fish and cook said fish. They also shared how he cared for his 14 year old child. The family was taken to the patient and provided a service prior to extubation. After an interlude to allow the family to be with the patient in his last moments the  returned and provided a post death service after he was pronounced.     Outcomes:  The patient's family expressed thanks for the care and were supporting one another.     Plan:  1.Spiritual care will continue to follow the patient according to [facility] spiritual care SOP.       Electronically signed by Chaplain Moreno, on 1/15/2024 at 12:37 PM.  Spiritual Care Department  [facility]  [phone]     01/15/24 1233   Encounter Summary   Encounter Overview/Reason  Grief, Loss, and Adjustments;Rituals, Rites and Sacraments;Spiritual/Emotional Needs;Family Care   Service Provided For: Patient;Family   Referral/Consult From: Nursing Supervisor/Manager   Support System Family members;Rastafari/lakeisha community   Last Encounter  01/15/24   Complexity of Encounter High   Begin Time 0800   End Time  0835   Total Time Calculated 35 min   Spiritual/Emotional needs   Type Spiritual Support;Spiritual Distress;Difficult news received;Emotional Distress 
Patient arrived to unit from ED via stretcher. Patient transferred to ICU bed and placed on continuous ICU bedside monitor. Patient admitted for Cardiac arrest (HCC) [I46.9]. Vitals obtained. See flowsheets. Patient's IV access includes CVC R IJ. Current infusions and rates of infusion include Levo @ 90 mcg/min, Vaso @ .04, Bicarb @50ml/hr. Assessment completed by KUMAR Pham RN. Two nurse skin assessment completed by KUMAR Pham RN and YON Connell RN. See flowsheets for assessment details. Policies and procedures of ICU unable to be explained to patient at this time. Family member(s)/representative(s) present at time of admission include Fiance, sister. Patient rights explained to family member(s)/representatives and patient, as able. Patient/patient's family member(s)/representative(s) N/A to have physician notified of their admission. All questions posed by patient's family member(s)/representative(s) and patient answered at this time.     
Patient extubated per order, DNR comfort care.   
Pharmacy Note  Bio[facility] Result    Sarwat Chavez is a 33 y.o. male, with a positive blood culture result    Communication received via fax from lab on 1/15/2024 at 6:47 AM    First Gram stain result: gram positive cocci in chains or pairs    BioFire BCID result: Strep pyogenes    BioFire BCID and gram stain congruent? Yes    Suspected source? TBD    Patient chart has been reviewed for signs/symptoms of infection: Yes  BP (!) 64/45   Pulse (!) 136   Temp 97.3 °F (36.3 °C) (Esophageal)   Resp 24   Wt 69.2 kg (152 lb 8.9 oz)   SpO2 97%   BMI 21.89 kg/m²   Lab Results   Component Value Date    WBC 9.3 01/15/2024     Allergies reviewed  Patient has no known allergies.    Renal function reviewed  Estimated Creatinine Clearance: 56 mL/min (A) (based on SCr of 1.8 mg/dL (H)).    Current antibiotic regimen: ceftriaxone + azithromycin    Intervention needed: Yes    Individual contacted: Dr. Stone    Recommendations: increase ceftriaxone to 2 g    Recommendations accepted? Yes    Pippa Heath, PharmD, BCPS, BCCCP  1/15/2024 7:20 AM      
5

## 2024-01-15 NOTE — DEATH NOTES
[facility]  Notice of Patient Passing      Patient Name- Sarwat Chavez   Acct Number- [id number]   Attending Physician- Gabe Calderon MD    Admitted on-1/14/2024 11:03 AM     On 1/15/2024 at 0830 patient was found in 4D14 with:   Absence of vital signs.   Absence of neurological response.    Confirmed time of death at 0830.   Physician or On-call Physician notified of time of death- yes    Family present at time of death- yes, multiple   Spiritual care present at time of death- no    Physician was notified and orders were obtained to release the body.   Post-Mortem documentation completed; form printed, signed, and given to admitting.    Thalia Chamberlain RN RN Nursing Supervisor/ Manager  1/15/24   9:40 AM    ________________________________________________________________________    Complete information below if 's Case only:    Death Notification    Reported ________________   [facility]’s Office  ’s Case __________   Internal Use Only   Autopsy       Y ____    N____  ’s & Investigator’s Notes  Agency    ________________  FAX:  [phone]   Agency #   _______________     Call Date: [id number]   Call Time: 0844  Notified by: BARB Godfrey-RN   Of: [facility]    Type of Death:   [x] Notification  []Hospice  [x]Hospital < 24 Hour  [] ED Death  []Home  []Nursing Home      Pt Name: Sarwat Chavez   Address: [address] [place] [id number]  Phone: [phone] (home)    SSN:     MRN: [id number]    AGE: 33 y.o.   YOB: 1990       GENDER: male     Primary Care Physician: Colin Min DO   Attending Physician Name: Dr. Calderon    Date of Death: 01/15/24  Time of Death: 0830  Pronounced By: Dr. Stone      Emergency Contact:   Name of Family Notified of Death: Mariel Ge   Relationship to Patient: Parent   Phone Number: [phone]  Address of Next of Kin:     Cause of

## 2024-01-15 NOTE — DISCHARGE SUMMARY
floor.  Last known well approximately 10:30 AM.  She states that he was shaking and his eyes rolled to the back of his head.  On EMS arrival to patient's home he was found to be in asystole and ACLS was started.  Patient was given 2 A of epi, initial GCS score of 3.  After 3 more rounds of epi patient went into A-fib and was shocked with 200 J.  ROSC was achieved after 60 minutes of CPR.  Patient was intubated.  EKG demonstrated ST segment elevation in aVR and aVL.  STEMI alert was called and patient was evaluated by cardiology.  Due to likelihood of anoxic brain injury and patient's unstable status he was not taken to Cath Lab.  Patient was started on vasopressin and Levophed as well as a bicarb drip for pH of 6.7.  On physical exam patient was found to have fixed pupils that are unreactive.  Patient does not withdraw to pain.  Due to continued hypotension art line was placed.  Patient now on 4 pressors as well as bicarb drip.  Discussion had with family regarding patient's poor prognosis and likelihood that his heart stop again.  Patient's family aware that pressors can lead to vasoconstriction of the extremities.  Patient's sister will have conversation with patient's mother when she arrives in town from [place] regarding CODE STATUS. Overnight patients mother arrived and request that code status be changed to DNR CCA.Pt continue to have severe refractory acidosis on bicarb drip. Pt on 4 pressors with MAPs in the 40s. Patient mottled up to his knees. 1/15 0820 patients mother requested that we withdraw care at this time. Pt pronounced  on 1/15/2024 at 0830.    Electronically signed by   Andrew Stone DO on 1/15/2024 at 8:02 AM Andrew Stone DO IM PGY1   Electronically signed by Gabe Calderon MD.

## 2024-01-15 NOTE — SIGNIFICANT EVENT
0820 Spoke with patient's mother who would like to withdraw care. Code status changed to DNR CC. Pt extubated and all IV gtts stopped.

## 2024-01-15 NOTE — PLAN OF CARE
Problem: Respiratory - Adult  Goal: Achieves optimal ventilation and oxygenation  Outcome: Not Progressing                                                Patient Weaning Progress    The patient's vent settings was not able to be weaned this shift.      Ventilator settings that were weaned              [] Mode   [] Pressure support weaned   [] Fio2 weaned   [] Peep weaned      Spontaneous weaning trial  was not attempted.     due to defined parameters for SBT (spontaneous breathing trial) not being met.     *Results of SBT documented under SBTOUTCOME note.    Reason that defined ventilator parameters for SBT was not met              [] Patient condition requires increased ventilator settings  [] Requires increased sedation   [] Settings not within weaning range   [] SAT not completed   [] Physician orders      Unable to get agreement for goals because no family is present and patient cannot respond.

## 2024-01-15 NOTE — SIGNIFICANT EVENT
Received signout   Patient postcardiac arrest, prolonged CPR 40 minutes, initial rhythm PEA, currently on multiple pressor support, norepinephrine, epinephrine, vasopressin, phenylephrine; starting her CRRT, currently with no sedation, poor neurological prognosis.  Mother is at bedside, information was given, questions were answered, I do not see a good prognosis in this patient, he my arrest during the next hours, I have ordered optimize volume with albumin and bicarb, ABGs showing respiratory acidosis, increased RR, new ABGs with normal CO2 but remains acidotic, pH 6.9, bicarb was given, increase bicarb drip; poor prognosis.

## 2024-01-15 NOTE — DEATH NOTES
Death Pronouncement Note  Patient's Name: Sarwat Chavez   Patient's YOB: 1990  MRN Number: [id number]    Admitting Provider: Gabe Calderon MD  Attending Provider: Gabe Calderon MD    Patient was examined and the following were absent: Pulses, Blood Pressure, and Respiratory effort    I declared the patient dead on 1/15/2024 at 0830.    Preliminary Cause of Death: Cardiopulmonary Arrest  Electronically signed by Andrew Stone DO on 1/15/24 at 8:33 AM EST

## 2024-01-16 LAB
BACTERIA SPEC AEROBE CULT: NORMAL
BACTERIA UR CULT: ABNORMAL
BACTERIA UR CULT: ABNORMAL
ORGANISM: ABNORMAL

## 2024-01-18 LAB
EKG ATRIAL RATE: 153 BPM
EKG P AXIS: 83 DEGREES
EKG P-R INTERVAL: 124 MS
EKG Q-T INTERVAL: 286 MS
EKG QRS DURATION: 80 MS
EKG QTC CALCULATION (BAZETT): 456 MS
EKG R AXIS: 94 DEGREES
EKG T AXIS: -67 DEGREES
EKG VENTRICULAR RATE: 153 BPM

## 2024-01-21 LAB
ANTICOAGULANTS, SERUM/PLASMA: NOT DETECTED
ANTICONVULSANTS, SERUM/PLASMA: NOT DETECTED
ANTIDEPRESSANTS, SERUM/PLASMA: NOT DETECTED
ANTIDIABETICS, SERUM/PLASMA: NOT DETECTED
ANTIHISTAMINES, SERUM/PLASMA: NOT DETECTED
ANTIPSYCHOTICS, SERUM/PLASMA: NOT DETECTED
APAP SERPL QL: NOT DETECTED
BENZODIAZEPINES, SERUM/PLASMA: NOT DETECTED
CARDIAC, SERUM/PLASMA: NOT DETECTED
COMPREHENSIVE DRUG PROMPT: NORMAL
COTININE SERPL QL: PRESENT
COTININE SERPL QL: PRESENT NG/ML
D-METHORPHAN SERPL QL: NOT DETECTED
MUSCLE RELAXANTS, SERUM/PLASMA: NOT DETECTED
OPIOIDS, SERUM/PLASMA: NOT DETECTED
SEDATIVE-HYPNOTICS, SERUM/PLASMA: NOT DETECTED
STIMULANTS, SERUM/PLASMA: NOT DETECTED
